# Patient Record
Sex: FEMALE | Race: BLACK OR AFRICAN AMERICAN | NOT HISPANIC OR LATINO | Employment: FULL TIME | URBAN - METROPOLITAN AREA
[De-identification: names, ages, dates, MRNs, and addresses within clinical notes are randomized per-mention and may not be internally consistent; named-entity substitution may affect disease eponyms.]

---

## 2023-05-02 LAB
ALANINE AMINOTRANSFERASE (SGPT) (U/L) IN SER/PLAS: 65 U/L (ref 7–45)
ALBUMIN (G/DL) IN SER/PLAS: 4.5 G/DL (ref 3.4–5)
ALKALINE PHOSPHATASE (U/L) IN SER/PLAS: 88 U/L (ref 33–110)
ANION GAP IN SER/PLAS: 11 MMOL/L (ref 10–20)
ASPARTATE AMINOTRANSFERASE (SGOT) (U/L) IN SER/PLAS: 38 U/L (ref 9–39)
BILIRUBIN TOTAL (MG/DL) IN SER/PLAS: 0.5 MG/DL (ref 0–1.2)
CALCIUM (MG/DL) IN SER/PLAS: 10.5 MG/DL (ref 8.6–10.6)
CARBON DIOXIDE, TOTAL (MMOL/L) IN SER/PLAS: 27 MMOL/L (ref 21–32)
CHLORIDE (MMOL/L) IN SER/PLAS: 102 MMOL/L (ref 98–107)
CHOLESTEROL (MG/DL) IN SER/PLAS: 156 MG/DL (ref 0–199)
CHOLESTEROL IN HDL (MG/DL) IN SER/PLAS: 51 MG/DL
CHOLESTEROL/HDL RATIO: 3.1
CREATININE (MG/DL) IN SER/PLAS: 0.71 MG/DL (ref 0.5–1.05)
ESTIMATED AVERAGE GLUCOSE FOR HBA1C: 206 MG/DL
GFR FEMALE: >90 ML/MIN/1.73M2
GLUCOSE (MG/DL) IN SER/PLAS: 165 MG/DL (ref 74–99)
HEMOGLOBIN A1C/HEMOGLOBIN TOTAL IN BLOOD: 8.8 %
LDL: 89 MG/DL (ref 0–99)
POTASSIUM (MMOL/L) IN SER/PLAS: 4.6 MMOL/L (ref 3.5–5.3)
PROTEIN TOTAL: 7.3 G/DL (ref 6.4–8.2)
SODIUM (MMOL/L) IN SER/PLAS: 135 MMOL/L (ref 136–145)
TRIGLYCERIDE (MG/DL) IN SER/PLAS: 82 MG/DL (ref 0–149)
UREA NITROGEN (MG/DL) IN SER/PLAS: 9 MG/DL (ref 6–23)
VLDL: 16 MG/DL (ref 0–40)

## 2023-05-03 LAB
ALBUMIN (MG/L) IN URINE: 24.5 MG/L
ALBUMIN/CREATININE (UG/MG) IN URINE: 17.3 UG/MG CRT (ref 0–30)
CREATININE (MG/DL) IN URINE: 142 MG/DL (ref 20–320)

## 2023-10-23 DIAGNOSIS — E11.9 TYPE 2 DIABETES MELLITUS WITHOUT COMPLICATION, WITHOUT LONG-TERM CURRENT USE OF INSULIN (MULTI): Primary | ICD-10-CM

## 2023-10-23 RX ORDER — FLASH GLUCOSE SENSOR
KIT MISCELLANEOUS
Qty: 2 EACH | Refills: 11 | Status: SHIPPED | OUTPATIENT
Start: 2023-10-23

## 2023-11-28 ENCOUNTER — ALLIED HEALTH (OUTPATIENT)
Dept: INTEGRATIVE MEDICINE | Facility: CLINIC | Age: 38
End: 2023-11-28
Payer: COMMERCIAL

## 2023-11-28 DIAGNOSIS — M99.04 SEGMENTAL AND SOMATIC DYSFUNCTION OF SACRAL REGION: ICD-10-CM

## 2023-11-28 DIAGNOSIS — M99.03 SEGMENTAL AND SOMATIC DYSFUNCTION OF LUMBAR REGION: Primary | ICD-10-CM

## 2023-11-28 DIAGNOSIS — M99.02 SEGMENTAL AND SOMATIC DYSFUNCTION OF THORACIC REGION: ICD-10-CM

## 2023-11-28 DIAGNOSIS — M54.50 LUMBOSACRAL PAIN: ICD-10-CM

## 2023-11-28 DIAGNOSIS — M54.17 LUMBOSACRAL RADICULITIS: ICD-10-CM

## 2023-11-28 DIAGNOSIS — M79.10 MYALGIA: ICD-10-CM

## 2023-11-28 PROCEDURE — 99203 OFFICE O/P NEW LOW 30 MIN: CPT | Performed by: CHIROPRACTOR

## 2023-11-28 PROCEDURE — 98941 CHIROPRACT MANJ 3-4 REGIONS: CPT | Performed by: CHIROPRACTOR

## 2023-11-28 ASSESSMENT — ENCOUNTER SYMPTOMS
DIFFICULTY URINATING: 0
FLANK PAIN: 0
MYALGIAS: 1
DIZZINESS: 0
UNEXPECTED WEIGHT CHANGE: 0
FEVER: 0
JOINT SWELLING: 0
BACK PAIN: 1
FATIGUE: 0
LIGHT-HEADEDNESS: 0
WEAKNESS: 0
NECK PAIN: 0
NECK STIFFNESS: 0
ARTHRALGIAS: 0
HEADACHES: 0
VOMITING: 0
NUMBNESS: 0
CHILLS: 0
TROUBLE SWALLOWING: 0
SHORTNESS OF BREATH: 0
CHEST TIGHTNESS: 0
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
## 2023-12-01 ENCOUNTER — ALLIED HEALTH (OUTPATIENT)
Dept: INTEGRATIVE MEDICINE | Facility: CLINIC | Age: 38
End: 2023-12-01
Payer: COMMERCIAL

## 2023-12-01 DIAGNOSIS — M99.03 SEGMENTAL AND SOMATIC DYSFUNCTION OF LUMBAR REGION: Primary | ICD-10-CM

## 2023-12-01 DIAGNOSIS — M54.50 LUMBOSACRAL PAIN: ICD-10-CM

## 2023-12-01 DIAGNOSIS — M54.17 LUMBOSACRAL RADICULITIS: ICD-10-CM

## 2023-12-01 DIAGNOSIS — M99.04 SEGMENTAL AND SOMATIC DYSFUNCTION OF SACRAL REGION: ICD-10-CM

## 2023-12-01 DIAGNOSIS — M99.02 SEGMENTAL AND SOMATIC DYSFUNCTION OF THORACIC REGION: ICD-10-CM

## 2023-12-01 DIAGNOSIS — M79.10 MYALGIA: ICD-10-CM

## 2023-12-01 PROCEDURE — 98941 CHIROPRACT MANJ 3-4 REGIONS: CPT | Performed by: CHIROPRACTOR

## 2023-12-01 NOTE — PROGRESS NOTES
Subjective   Patient ID: Sofya Beck is a 37 y.o. female who presents December 1, 2023 for low back pain.    (2/12) [facility]    Today, the patient rates their degree of pain as a 6 out of 10 on the numeric pain rating scale.     Sofya returns for continued treatment of low back pain. She states that she had mild improvement in symptoms after last visit. She states that she was able to sleep about 4 hours that night before discomfort woke her up. She states that the next morning going out into the cold all of her symptoms returned. She states that she is at baseline today. She states that she has been trying the stretches but they have caused pain while doing them. Denies any associated symptoms or change in medical history since last visit and will follow up in 1 week.            Objective   Physical Exam  Constitutional:       General: She is not in acute distress.     Appearance: Normal appearance.       HENT:      Head: Normocephalic and atraumatic.   Musculoskeletal:      Thoracic back: Tenderness present.      Lumbar back: Tenderness present. Decreased range of motion.   Neurological:      General: No focal deficit present.      Mental Status: She is alert and oriented to person, place, and time.      Cranial Nerves: No dysarthria or facial asymmetry.      Sensory: Sensation is intact.      Motor: Motor function is intact.      Coordination: Coordination is intact.      Gait: Gait is intact.   Psychiatric:         Attention and Perception: Attention normal.         Mood and Affect: Mood normal.         Speech: Speech normal.         Behavior: Behavior is cooperative.         Palpation of the following region(s) revealed:    Thoracic: Thoracic paraspinals bilateral, hypertonicity and tenderness.  Lumbar: Lumbar paraspinals bilateral, hypertonicity and tenderness.  Quadratus lumborum left, hypertonicity and tenderness.  Gluteal left, hypertonicity and tenderness.  Piriformis left, hypertonicity and  tenderness.        Segmental Joint(s): Segmental joint dysfunction was assessed with motion palpation and is identified in the following areas:  Thoracic : T3, T4, T8, T9, and T12  Lumbopelvic / Sacral SIJ : L1, L3, L5, L5/S1, and L SIJ  Upper / Lower Extremities : R Hip and L Hip      Assessment/Plan   Today's Treatment Included: Chiropractic manipulation to the  Segmental Joint(s) Lumbopelvic/Sacral SIJ : L1, L3, L5, L5/S1, and L SIJ Segmental Joint(s) Thoracic : T3, T4, T8, T9, and T12 Segmental Joints Upper/Lower Extremities : R Hip and L Hip  Treatment Techniques Used : Diversified CMT, Pelvic drop table technique, and Manual traction  Soft-Tissue manipulation  Integrative Dry Needling (IDN) - Needles in / out:  8. TS and LS paraspinals    Soft-tissue mobilization was performed in the following areas:     Thoracic Paraspinal mm. bilateral  Lumbar Paraspinal mm. bilateral, Quadratus Lumborum left, Gluteal mm.  left, and Piriformis left            The patient tolerated today's treatment with little or no additional discomfort and was instructed to contact the office for questions or concerns.

## 2023-12-14 ENCOUNTER — ALLIED HEALTH (OUTPATIENT)
Dept: INTEGRATIVE MEDICINE | Facility: CLINIC | Age: 38
End: 2023-12-14
Payer: COMMERCIAL

## 2023-12-14 DIAGNOSIS — M54.17 LUMBOSACRAL RADICULITIS: ICD-10-CM

## 2023-12-14 DIAGNOSIS — M99.02 SEGMENTAL AND SOMATIC DYSFUNCTION OF THORACIC REGION: ICD-10-CM

## 2023-12-14 DIAGNOSIS — M99.03 SEGMENTAL AND SOMATIC DYSFUNCTION OF LUMBAR REGION: Primary | ICD-10-CM

## 2023-12-14 DIAGNOSIS — M99.01 SEGMENTAL AND SOMATIC DYSFUNCTION OF CERVICAL REGION: ICD-10-CM

## 2023-12-14 DIAGNOSIS — M54.50 LUMBOSACRAL PAIN: ICD-10-CM

## 2023-12-14 DIAGNOSIS — M99.04 SEGMENTAL AND SOMATIC DYSFUNCTION OF SACRAL REGION: ICD-10-CM

## 2023-12-14 DIAGNOSIS — M79.10 MYALGIA: ICD-10-CM

## 2023-12-14 PROCEDURE — 98941 CHIROPRACT MANJ 3-4 REGIONS: CPT | Performed by: CHIROPRACTOR
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
## 2023-12-21 ENCOUNTER — ALLIED HEALTH (OUTPATIENT)
Dept: INTEGRATIVE MEDICINE | Facility: CLINIC | Age: 38
End: 2023-12-21
Payer: COMMERCIAL

## 2023-12-21 DIAGNOSIS — M79.10 MYALGIA: ICD-10-CM

## 2023-12-21 DIAGNOSIS — M99.01 SEGMENTAL AND SOMATIC DYSFUNCTION OF CERVICAL REGION: ICD-10-CM

## 2023-12-21 DIAGNOSIS — M54.50 LUMBOSACRAL PAIN: ICD-10-CM

## 2023-12-21 DIAGNOSIS — M99.03 SEGMENTAL AND SOMATIC DYSFUNCTION OF LUMBAR REGION: Primary | ICD-10-CM

## 2023-12-21 DIAGNOSIS — M99.02 SEGMENTAL AND SOMATIC DYSFUNCTION OF THORACIC REGION: ICD-10-CM

## 2023-12-21 DIAGNOSIS — M99.04 SEGMENTAL AND SOMATIC DYSFUNCTION OF SACRAL REGION: ICD-10-CM

## 2023-12-21 PROCEDURE — 98941 CHIROPRACT MANJ 3-4 REGIONS: CPT | Performed by: CHIROPRACTOR

## 2023-12-21 NOTE — PROGRESS NOTES
Subjective   Patient ID: Sofya Beck is a 37 y.o. female who presents December 21, 2023 for low back pain.    (4/12) [facility]    Today, the patient rates their degree of pain as a 4 out of 10 on the numeric pain rating scale.     Sofya returns for continued treatment of low back pain. She states that she had improvement in symptoms after last visit but as significantly as last visit. She states that TL junction discomfort has decreased and she has more left sided low back pain. She states that her neck felt better but tension has returned. Denies any associated symptoms or change in medical history since last visit and will follow up in 1 week.            Objective   Physical Exam  Constitutional:       General: She is not in acute distress.     Appearance: Normal appearance.       HENT:      Head: Normocephalic and atraumatic.   Musculoskeletal:      Thoracic back: Tenderness present.      Lumbar back: Tenderness present. Decreased range of motion.   Neurological:      General: No focal deficit present.      Mental Status: She is alert and oriented to person, place, and time.      Cranial Nerves: No dysarthria or facial asymmetry.      Sensory: Sensation is intact.      Motor: Motor function is intact.      Coordination: Coordination is intact.      Gait: Gait is intact.   Psychiatric:         Attention and Perception: Attention normal.         Mood and Affect: Mood normal.         Speech: Speech normal.         Behavior: Behavior is cooperative.         Palpation of the following region(s) revealed:    Thoracic: Thoracic paraspinals bilateral, hypertonicity and tenderness.  Lumbar: Lumbar paraspinals bilateral, hypertonicity and tenderness.  Quadratus lumborum left, hypertonicity and tenderness.  Gluteal left, hypertonicity and tenderness.  Piriformis left, hypertonicity and tenderness.        Segmental Joint(s): Segmental joint dysfunction was assessed with motion palpation and is identified in the following  areas:  Cervical : C2 C6 C7  Thoracic : T3, T4, T8, T9, and T12  Lumbopelvic / Sacral SIJ : L1, L3, L5, L5/S1, and L SIJ  Upper / Lower Extremities : R Hip and L Hip      Assessment/Plan   Today's Treatment Included: Chiropractic manipulation to the Segmental Joint(s) Cervical : C2 C6 C7 Segmental Joint(s) Lumbopelvic/Sacral SIJ : L1, L3, L5, L5/S1, and L SIJ Segmental Joint(s) Thoracic : T3, T4, T8, T9, and T12 Segmental Joints Upper/Lower Extremities : R Hip and L Hip  Treatment Techniques Used : Diversified CMT, Pelvic drop table technique, and Manual traction  Soft-Tissue manipulation  Integrative Dry Needling (IDN) - Needles in / out:  8. TS and LS paraspinals, left    Soft-tissue mobilization was performed in the following areas:     Thoracic Paraspinal mm. bilateral  Lumbar Paraspinal mm. bilateral, Quadratus Lumborum left, Gluteal mm.  left, and Piriformis left            The patient tolerated today's treatment with little or no additional discomfort and was instructed to contact the office for questions or concerns.

## 2023-12-28 ENCOUNTER — ALLIED HEALTH (OUTPATIENT)
Dept: INTEGRATIVE MEDICINE | Facility: CLINIC | Age: 38
End: 2023-12-28
Payer: COMMERCIAL

## 2023-12-28 DIAGNOSIS — M99.03 SEGMENTAL AND SOMATIC DYSFUNCTION OF LUMBAR REGION: Primary | ICD-10-CM

## 2023-12-28 DIAGNOSIS — M79.10 MYALGIA: ICD-10-CM

## 2023-12-28 DIAGNOSIS — M99.04 SEGMENTAL AND SOMATIC DYSFUNCTION OF SACRAL REGION: ICD-10-CM

## 2023-12-28 DIAGNOSIS — M54.50 LUMBOSACRAL PAIN: ICD-10-CM

## 2023-12-28 DIAGNOSIS — M99.02 SEGMENTAL AND SOMATIC DYSFUNCTION OF THORACIC REGION: ICD-10-CM

## 2023-12-28 DIAGNOSIS — M99.01 SEGMENTAL AND SOMATIC DYSFUNCTION OF CERVICAL REGION: ICD-10-CM

## 2023-12-28 PROCEDURE — 98941 CHIROPRACT MANJ 3-4 REGIONS: CPT | Performed by: CHIROPRACTOR

## 2023-12-28 NOTE — PROGRESS NOTES
Subjective   Patient ID: Sofya Beck is a 38 y.o. female who presents December 28, 2023 for low back pain.    (5/12) [facility]    Today, the patient rates their degree of pain as a 3 out of 10 on the numeric pain rating scale.     Sofya returns for continued treatment of low back pain. She states that she had improvement in back symptoms. She states that she has no more TL junction symptoms. She states that she has mild low back discomfort. She states that she woke up yesterday with torticollis and neck pain. She states that she took NSAIDs and that has helped relieve symptoms. She states that symptoms are better today but she still has limited cervical ROM and pain in right rotation. Denies any associated symptoms or change in medical history since last visit and will follow up in 1 week.            Objective   Physical Exam  Constitutional:       General: She is not in acute distress.     Appearance: Normal appearance.       HENT:      Head: Normocephalic and atraumatic.   Musculoskeletal:      Thoracic back: Tenderness present.      Lumbar back: Tenderness present. Decreased range of motion.   Neurological:      General: No focal deficit present.      Mental Status: She is alert and oriented to person, place, and time.      Cranial Nerves: No dysarthria or facial asymmetry.      Sensory: Sensation is intact.      Motor: Motor function is intact.      Coordination: Coordination is intact.      Gait: Gait is intact.   Psychiatric:         Attention and Perception: Attention normal.         Mood and Affect: Mood normal.         Speech: Speech normal.         Behavior: Behavior is cooperative.       Palpation of the following region(s) revealed:    Thoracic: Thoracic paraspinals bilateral, hypertonicity and tenderness.  Lumbar: Lumbar paraspinals bilateral, hypertonicity and tenderness.  Quadratus lumborum left, hypertonicity and tenderness.  Gluteal left, hypertonicity and tenderness.  Piriformis left,  hypertonicity and tenderness.        Segmental Joint(s): Segmental joint dysfunction was assessed with motion palpation and is identified in the following areas:  Cervical : C2 C6 C7  Thoracic : T3, T4, T8, T9, and T12  Lumbopelvic / Sacral SIJ : L1, L3, L5, L5/S1, and L SIJ  Upper / Lower Extremities : R Hip and L Hip      Assessment/Plan   Today's Treatment Included: Chiropractic manipulation to the Segmental Joint(s) Cervical : C2 C6 C7 Segmental Joint(s) Lumbopelvic/Sacral SIJ : L1, L3, L5, L5/S1, and L SIJ Segmental Joint(s) Thoracic : T3, T4, T8, T9, and T12 Segmental Joints Upper/Lower Extremities : R Hip and L Hip  Treatment Techniques Used : Diversified CMT, Pelvic drop table technique, and Manual traction  Soft-Tissue manipulation  Integrative Dry Needling (IDN) - Needles in / out:  4. LS paraspinals, bilateral    Soft-tissue mobilization was performed in the following areas:     Thoracic Paraspinal mm. bilateral  Lumbar Paraspinal mm. bilateral, Quadratus Lumborum left, Gluteal mm.  left, and Piriformis left            The patient tolerated today's treatment with little or no additional discomfort and was instructed to contact the office for questions or concerns.

## 2024-01-05 NOTE — PROGRESS NOTES
[facility]UV for diabetes. LV with me 2023.    History of Present IllnessCGM INTERPRETATION:  38 y.o. female with hx of uncontrolled type 2 DM .     Dx: 2016  HbA1c: 6.4% (2024), 8.8% (2023), 9.9% (), 12.6% (2021)  Current regimen: metformin 1g BID, glipizide 10 mg BID, Ozempic 1 mg/week  Past medications: Victoza (nausea), Trulicity  Complications: neuropathy  Comorbidities: obesity, HLD     SMBG: Abraham 2     Hypoglycemia: no     Diet:  Breakfast: sausage and eggs  Lunch sandwich and chips  Dinner baked chicken and spinach  Drinks: mostly unsweet tea but sometimes sweetened, occasional regular ginger ale    ROS  General: no fever or chills  CV: no chest pain   Respiratory: no shortness of breath  MSK: no lower extremity edema  Neuro: no headache or dizziness  See HPI for Endocrine ROS    Past Medical History:   Diagnosis Date    Personal history of other endocrine, nutritional and metabolic disease 2020    History of diabetes mellitus       Past Surgical History:   Procedure Laterality Date     SECTION, LOW TRANSVERSE  2015     Section Low Transverse       Social History     Socioeconomic History    Marital status: Single     Spouse name: Not on file    Number of children: Not on file    Years of education: Not on file    Highest education level: Not on file   Occupational History    Not on file   Tobacco Use    Smoking status: Unknown    Smokeless tobacco: Not on file   Substance and Sexual Activity    Alcohol use: Not on file    Drug use: Not on file    Sexual activity: Not on file   Other Topics Concern    Not on file   Social History Narrative    Not on file     Social Determinants of Health     Financial Resource Strain: Not on file   Food Insecurity: Not on file   Transportation Needs: Not on file   Physical Activity: Not on file   Stress: Not on file   Social Connections: Not on file   Intimate Partner Violence: Not on file   Housing Stability: Not on file        Physical Exam   weight is 91.2 kg (201 lb 1.6 oz). Her temperature is 36.3 °C (97.3 °F). Her blood pressure is 126/79 and her pulse is 79. Her respiration is 16.   General: not in acute distress  HEENT: RYAN HUDSON  Thyroid: no goiter  Neuro: alert and oriented x 3    Current Outpatient Medications   Medication Sig Dispense Refill    albuterol 90 mcg/actuation inhaler Inhale 2 puffs every 6 hours if needed.      aspirin 81 mg EC tablet Take 1 tablet (81 mg) by mouth once daily.      glipiZIDE (Glucotrol) 10 mg tablet Take 1 tablet (10 mg) by mouth 2 times a day. before meals      metFORMIN (Glucophage) 1,000 mg tablet Take 1 tablet (1,000 mg) by mouth 2 times a day with meals.      simvastatin (Zocor) 20 mg tablet Take 1 tablet (20 mg) by mouth once daily.      acetaminophen (Tylenol) 500 mg tablet Take 2 tablets (1,000 mg) by mouth every 8 hours if needed.      FreeStyle Abraham sensor system (FreeStyle Abraham 2 Sensor) kit CHANGE SENSOR EVERY 14 DAYS AS DIRECTED 2 each 11    Ozempic 1 mg/dose (4 mg/3 mL) pen injector Inject 1 mg under the skin 1 (one) time per week.       No current facility-administered medications for this visit.         Assessment and Plan  38 y.o. female with hx of uncontrolled T2DM, here for follow-up.     1. Type 2 diabetes mellitus, uncontrolled  HbA1c: 6.4% (1/11/2024), 8.8% (05/2023), 9.9% (11/22/52021), 12.6% (07/2021)  Current regimen: metformin 1g BID, glipizide 10 mg BID, Ozempic 1 mg/week  Eye exam: no DR (02/2023)  Urine microalbumin: 17 mg/g (05/2023)  Lipids: HDL 51, LDL 89, TG 82 (05/2023) on simvastatin 20 mg QDAY     A1c: 6.4%!   Abraham download reviewed. TIR 74%, hypoglycemia 2%.  Significant improvement in blood sugars since changing from Trulicity to Ozempic.  Having fasting hypoglycemia. Will reduce evening glipizide. She takes the glipizide after dinner; advised to take before dinner.  Tolerating Ozempic without issue. Will increase to 2 mg dose.  The patient is looking  forward to being able to reduce her medications.  Discussed that we will try to reduce/discontinue glipizide first, then metformin if glycemic control allows.  Discussed that reducing carbs is still necessary to continue to reduce medications given the post-prandial hyperglycemia she has.    PLAN:  -increase Ozempic 2 mg/week  -decrease glipizide to 10 mg AM, 5 mg evening (before dinner)  -continue metformin 1g BID  -continue simvastatin 20 mg QDAY  -advised patient to notify me for BG<80  -due for eye exam 02/2024 (patient to schedule)  -check HbA1c, urine microalbumin, lipids before next visit    Follow-up in 4 months (labs prior)  Uses MyChart.

## 2024-01-11 ENCOUNTER — OFFICE VISIT (OUTPATIENT)
Dept: ENDOCRINOLOGY | Facility: CLINIC | Age: 39
End: 2024-01-11
Payer: COMMERCIAL

## 2024-01-11 ENCOUNTER — ALLIED HEALTH (OUTPATIENT)
Dept: INTEGRATIVE MEDICINE | Facility: CLINIC | Age: 39
End: 2024-01-11
Payer: COMMERCIAL

## 2024-01-11 VITALS
WEIGHT: 201.1 LBS | HEART RATE: 79 BPM | RESPIRATION RATE: 16 BRPM | TEMPERATURE: 97.3 F | BODY MASS INDEX: 32.46 KG/M2 | DIASTOLIC BLOOD PRESSURE: 79 MMHG | SYSTOLIC BLOOD PRESSURE: 126 MMHG

## 2024-01-11 DIAGNOSIS — M99.02 SEGMENTAL AND SOMATIC DYSFUNCTION OF THORACIC REGION: ICD-10-CM

## 2024-01-11 DIAGNOSIS — E11.65 TYPE 2 DIABETES MELLITUS WITH HYPERGLYCEMIA, WITHOUT LONG-TERM CURRENT USE OF INSULIN (MULTI): Primary | ICD-10-CM

## 2024-01-11 DIAGNOSIS — M99.01 SEGMENTAL AND SOMATIC DYSFUNCTION OF CERVICAL REGION: ICD-10-CM

## 2024-01-11 DIAGNOSIS — E78.2 MIXED HYPERLIPIDEMIA: ICD-10-CM

## 2024-01-11 DIAGNOSIS — M54.50 LUMBOSACRAL PAIN: ICD-10-CM

## 2024-01-11 DIAGNOSIS — M79.10 MYALGIA: ICD-10-CM

## 2024-01-11 DIAGNOSIS — M99.04 SEGMENTAL AND SOMATIC DYSFUNCTION OF SACRAL REGION: ICD-10-CM

## 2024-01-11 DIAGNOSIS — M99.03 SEGMENTAL AND SOMATIC DYSFUNCTION OF LUMBAR REGION: Primary | ICD-10-CM

## 2024-01-11 DIAGNOSIS — E10.69 TYPE 1 DIABETES MELLITUS WITH OTHER SPECIFIED COMPLICATION (MULTI): ICD-10-CM

## 2024-01-11 LAB — POC HEMOGLOBIN A1C: 6.4 % (ref 4.2–6.5)

## 2024-01-11 PROCEDURE — 3078F DIAST BP <80 MM HG: CPT | Performed by: STUDENT IN AN ORGANIZED HEALTH CARE EDUCATION/TRAINING PROGRAM

## 2024-01-11 PROCEDURE — 95251 CONT GLUC MNTR ANALYSIS I&R: CPT | Performed by: STUDENT IN AN ORGANIZED HEALTH CARE EDUCATION/TRAINING PROGRAM

## 2024-01-11 PROCEDURE — 3074F SYST BP LT 130 MM HG: CPT | Performed by: STUDENT IN AN ORGANIZED HEALTH CARE EDUCATION/TRAINING PROGRAM

## 2024-01-11 PROCEDURE — 99215 OFFICE O/P EST HI 40 MIN: CPT | Performed by: STUDENT IN AN ORGANIZED HEALTH CARE EDUCATION/TRAINING PROGRAM

## 2024-01-11 PROCEDURE — 98941 CHIROPRACT MANJ 3-4 REGIONS: CPT | Performed by: CHIROPRACTOR

## 2024-01-11 PROCEDURE — 83036 HEMOGLOBIN GLYCOSYLATED A1C: CPT | Performed by: STUDENT IN AN ORGANIZED HEALTH CARE EDUCATION/TRAINING PROGRAM

## 2024-01-11 RX ORDER — METFORMIN HYDROCHLORIDE 1000 MG/1
1000 TABLET ORAL
COMMUNITY
Start: 2023-08-04 | End: 2024-01-19

## 2024-01-11 RX ORDER — GLIPIZIDE 10 MG/1
10 TABLET ORAL 2 TIMES DAILY
COMMUNITY
Start: 2023-08-04 | End: 2024-01-11 | Stop reason: SDUPTHER

## 2024-01-11 RX ORDER — SIMVASTATIN 20 MG/1
20 TABLET, FILM COATED ORAL
Qty: 90 TABLET | Refills: 3 | Status: SHIPPED | OUTPATIENT
Start: 2024-01-11

## 2024-01-11 RX ORDER — SEMAGLUTIDE 2.68 MG/ML
2 INJECTION, SOLUTION SUBCUTANEOUS
Qty: 9 ML | Refills: 1 | Status: SHIPPED | OUTPATIENT
Start: 2024-01-11

## 2024-01-11 RX ORDER — SIMVASTATIN 20 MG/1
20 TABLET, FILM COATED ORAL
COMMUNITY
Start: 2016-05-18 | End: 2024-01-11 | Stop reason: SDUPTHER

## 2024-01-11 RX ORDER — GLIPIZIDE 10 MG/1
TABLET ORAL
Qty: 135 TABLET | Refills: 1 | Status: SHIPPED | OUTPATIENT
Start: 2024-01-11

## 2024-01-11 RX ORDER — SEMAGLUTIDE 1.34 MG/ML
1 INJECTION, SOLUTION SUBCUTANEOUS
COMMUNITY
End: 2024-01-11 | Stop reason: ALTCHOICE

## 2024-01-11 RX ORDER — ALBUTEROL SULFATE 90 UG/1
2 AEROSOL, METERED RESPIRATORY (INHALATION) EVERY 6 HOURS PRN
COMMUNITY
Start: 2021-08-14

## 2024-01-11 RX ORDER — ASPIRIN 81 MG/1
81 TABLET ORAL DAILY
COMMUNITY
Start: 2018-03-20

## 2024-01-11 RX ORDER — ACETAMINOPHEN 500 MG
1000 TABLET ORAL EVERY 8 HOURS PRN
COMMUNITY

## 2024-01-11 ASSESSMENT — PAIN SCALES - GENERAL: PAINLEVEL: 0-NO PAIN

## 2024-01-11 NOTE — PATIENT INSTRUCTIONS
Increase Ozempic to 2 mg, once a week  Change glipizide to 1 tablet in the morning and 1/2 table before dinner  Continue Metformin as you are  Please have blood and urine tests a few days before your next appointment with me

## 2024-01-11 NOTE — PROGRESS NOTES
Subjective   Patient ID: Sofya Beck is a 38 y.o. female who presents January 11, 2024 for low back pain.    (1/12) VPCY    Today, the patient rates their degree of pain as a 2 out of 10 on the numeric pain rating scale.     Sofya returns for continued treatment of low back pain. She states that she has less low back discomfort. She states that it is mostly just tender to the touch. She states that her neck is slightly better, she has much more range of motion but it is still painful. 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 Denies any associated symptoms or change in medical history since last visit and will follow up in 1 week.            Objective   Physical Exam  Constitutional:       General: She is not in acute distress.     Appearance: Normal appearance.       HENT:      Head: Normocephalic and atraumatic.   Musculoskeletal:      Thoracic back: Tenderness present.      Lumbar back: Tenderness present. Decreased range of motion.   Neurological:      General: No focal deficit present.      Mental Status: She is alert and oriented to person, place, and time.      Cranial Nerves: No dysarthria or facial asymmetry.      Sensory: Sensation is intact.      Motor: Motor function is intact.      Coordination: Coordination is intact.      Gait: Gait is intact.   Psychiatric:         Attention and Perception: Attention normal.         Mood and Affect: Mood normal.         Speech: Speech normal.         Behavior: Behavior is cooperative.         Palpation of the following region(s) revealed:    Thoracic: Thoracic paraspinals bilateral, hypertonicity and tenderness.  Lumbar: Lumbar paraspinals bilateral, hypertonicity and tenderness.  Quadratus lumborum left, hypertonicity and tenderness.  Gluteal left, hypertonicity and tenderness.  Piriformis left, hypertonicity and tenderness.        Segmental Joint(s): Segmental joint dysfunction was assessed  with motion palpation and is identified in the following areas:  Cervical : C2 C6 C7  Thoracic : T3, T4, T8, T9, and T12  Lumbopelvic / Sacral SIJ : L1, L3, L5, L5/S1, and L SIJ  Upper / Lower Extremities : R Hip and L Hip      Assessment/Plan   Today's Treatment Included: Chiropractic manipulation to the Segmental Joint(s) Cervical : C2 C6 C7 Segmental Joint(s) Lumbopelvic/Sacral SIJ : L1, L3, L5, L5/S1, and L SIJ Segmental Joint(s) Thoracic : T3, T4, T8, T9, and T12 Segmental Joints Upper/Lower Extremities : R Hip and L Hip  Treatment Techniques Used : Diversified CMT, Pelvic drop table technique, and Manual traction  Soft-Tissue manipulation     Soft-tissue mobilization was performed in the following areas:     Thoracic Paraspinal mm. bilateral  Lumbar Paraspinal mm. bilateral, Quadratus Lumborum left, Gluteal mm.  left, and Piriformis left            The patient tolerated today's treatment with little or no additional discomfort and was instructed to contact the office for questions or concerns.

## 2024-01-18 DIAGNOSIS — E11.65 TYPE 2 DIABETES MELLITUS WITH HYPERGLYCEMIA, WITHOUT LONG-TERM CURRENT USE OF INSULIN (MULTI): Primary | ICD-10-CM

## 2024-01-19 RX ORDER — METFORMIN HYDROCHLORIDE 1000 MG/1
1000 TABLET ORAL 2 TIMES DAILY
Qty: 180 TABLET | Refills: 2 | Status: SHIPPED | OUTPATIENT
Start: 2024-01-19

## 2024-03-05 ENCOUNTER — TELEPHONE (OUTPATIENT)
Dept: PRIMARY CARE | Facility: CLINIC | Age: 39
End: 2024-03-05
Payer: COMMERCIAL

## 2024-03-05 DIAGNOSIS — R42 DIZZINESS: Primary | ICD-10-CM

## 2024-03-05 RX ORDER — SCOLOPAMINE TRANSDERMAL SYSTEM 1 MG/1
1 PATCH, EXTENDED RELEASE TRANSDERMAL
Qty: 10 PATCH | Refills: 0 | Status: SHIPPED | OUTPATIENT
Start: 2024-03-05 | End: 2024-05-04

## 2024-06-25 ENCOUNTER — LAB (OUTPATIENT)
Dept: LAB | Facility: LAB | Age: 39
End: 2024-06-25
Payer: COMMERCIAL

## 2024-06-25 DIAGNOSIS — E11.65 TYPE 2 DIABETES MELLITUS WITH HYPERGLYCEMIA, WITHOUT LONG-TERM CURRENT USE OF INSULIN (MULTI): ICD-10-CM

## 2024-06-25 LAB
CREAT UR-MCNC: 137.7 MG/DL (ref 20–320)
MICROALBUMIN UR-MCNC: <7 MG/L
MICROALBUMIN/CREAT UR: NORMAL MG/G{CREAT}

## 2024-06-25 PROCEDURE — 80053 COMPREHEN METABOLIC PANEL: CPT

## 2024-06-25 PROCEDURE — 36415 COLL VENOUS BLD VENIPUNCTURE: CPT

## 2024-06-25 PROCEDURE — 82570 ASSAY OF URINE CREATININE: CPT

## 2024-06-25 PROCEDURE — 80061 LIPID PANEL: CPT

## 2024-06-25 PROCEDURE — 83036 HEMOGLOBIN GLYCOSYLATED A1C: CPT

## 2024-06-25 PROCEDURE — 82043 UR ALBUMIN QUANTITATIVE: CPT

## 2024-06-26 LAB
ALBUMIN SERPL BCP-MCNC: 4.6 G/DL (ref 3.4–5)
ALP SERPL-CCNC: 87 U/L (ref 33–110)
ALT SERPL W P-5'-P-CCNC: 50 U/L (ref 7–45)
ANION GAP SERPL CALC-SCNC: 13 MMOL/L (ref 10–20)
AST SERPL W P-5'-P-CCNC: 27 U/L (ref 9–39)
BILIRUB SERPL-MCNC: 0.2 MG/DL (ref 0–1.2)
BUN SERPL-MCNC: 11 MG/DL (ref 6–23)
CALCIUM SERPL-MCNC: 10 MG/DL (ref 8.6–10.6)
CHLORIDE SERPL-SCNC: 101 MMOL/L (ref 98–107)
CHOLEST SERPL-MCNC: 172 MG/DL (ref 0–199)
CHOLESTEROL/HDL RATIO: 3.1
CO2 SERPL-SCNC: 28 MMOL/L (ref 21–32)
CREAT SERPL-MCNC: 0.78 MG/DL (ref 0.5–1.05)
EGFRCR SERPLBLD CKD-EPI 2021: >90 ML/MIN/1.73M*2
EST. AVERAGE GLUCOSE BLD GHB EST-MCNC: 111 MG/DL
GLUCOSE SERPL-MCNC: 100 MG/DL (ref 74–99)
HBA1C MFR BLD: 5.5 %
HDLC SERPL-MCNC: 54.7 MG/DL
LDLC SERPL CALC-MCNC: 103 MG/DL
NON HDL CHOLESTEROL: 117 MG/DL (ref 0–149)
POTASSIUM SERPL-SCNC: 4.1 MMOL/L (ref 3.5–5.3)
PROT SERPL-MCNC: 7.3 G/DL (ref 6.4–8.2)
SODIUM SERPL-SCNC: 138 MMOL/L (ref 136–145)
TRIGL SERPL-MCNC: 71 MG/DL (ref 0–149)
VLDL: 14 MG/DL (ref 0–40)

## 2024-06-28 NOTE — PROGRESS NOTES
FUV for diabetes. LV with me 2024.    History of Present IllnessCGM INTERPRETATION:  38 y.o. female with hx of type 2 diabetes.     Dx: 2016  HbA1c: 5.5% (2024), 6.4% (2024), 8.8% (2023), 9.9% (), 12.6% (2021)  Current regimen: metformin 1g BID, glipizide 10 mg QDAY, Ozempic 2 mg/week  Past medications: Victoza (nausea), Trulicity  Complications: neuropathy  Comorbidities: obesity, HLD     SMBG: Abraham 2     Hypoglycemia: no     Diet:  Breakfast: sausage and eggs  Lunch sandwich and chips  Dinner baked chicken and spinach  Drinks: mostly unsweet tea but sometimes sweetened, occasional regular ginger ale    Today:  -decreased glipizide to 10 mg QDAY about 1-2 weeks ago    ROS  General: no fever or chills  CV: no chest pain   Respiratory: no shortness of breath  MSK: no lower extremity edema  Neuro: no headache or dizziness  See HPI for Endocrine ROS    Past Medical History:   Diagnosis Date    Personal history of other endocrine, nutritional and metabolic disease 2020    History of diabetes mellitus       Past Surgical History:   Procedure Laterality Date     SECTION, LOW TRANSVERSE  2015     Section Low Transverse       Social History     Socioeconomic History    Marital status: Single     Spouse name: Not on file    Number of children: Not on file    Years of education: Not on file    Highest education level: Not on file   Occupational History    Not on file   Tobacco Use    Smoking status: Unknown    Smokeless tobacco: Not on file   Substance and Sexual Activity    Alcohol use: Not on file    Drug use: Not on file    Sexual activity: Not on file   Other Topics Concern    Not on file   Social History Narrative    Not on file     Social Determinants of Health     Financial Resource Strain: Not on file   Food Insecurity: Not on file   Transportation Needs: Not on file   Physical Activity: Not on file   Stress: Not on file   Social Connections: Not on file    Intimate Partner Violence: Not on file   Housing Stability: Not on file       Physical Exam   vitals were not taken for this visit.   General: not in acute distress  HEENT: RYAN HUDSON  Thyroid: no goiter  Neuro: alert and oriented x 3    Current Outpatient Medications   Medication Sig Dispense Refill    acetaminophen (Tylenol) 500 mg tablet Take 2 tablets (1,000 mg) by mouth every 8 hours if needed.      albuterol 90 mcg/actuation inhaler Inhale 2 puffs every 6 hours if needed.      aspirin 81 mg EC tablet Take 1 tablet (81 mg) by mouth once daily.      FreeStyle Abraham sensor system (FreeStyle Abraham 2 Sensor) kit CHANGE SENSOR EVERY 14 DAYS AS DIRECTED 2 each 11    glipiZIDE (Glucotrol) 10 mg tablet Take 1 tablet before breakfast and 1/2 tablet before dinner. 135 tablet 1    metFORMIN (Glucophage) 1,000 mg tablet TAKE ONE TABLET BY MOUTH TWO TIMES A  tablet 2    semaglutide (Ozempic) 2 mg/dose (8 mg/3 mL) pen injector Inject 2 mg under the skin 1 (one) time per week. 9 mL 1    simvastatin (Zocor) 20 mg tablet Take 1 tablet (20 mg) by mouth once daily. 90 tablet 3     No current facility-administered medications for this visit.         Assessment and Plan  38 y.o. female with hx of uncontrolled T2DM, here for follow-up.     1. Type 2 diabetes mellitus, uncontrolled  HbA1c: 5.5% (6/25/2024), 6.4% (1/11/2024), 8.8% (05/2023), 9.9% (11/22/52021), 12.6% (07/2021)  Current regimen: metformin 1g BID, glipizide 10 mg QDAY, Ozempic 2 mg/week  Eye exam: no DR (02/2023)  Urine microalbumin: neg (06/2024)  Podiatry: LV 2022, was told to see on a prn basis  Lipids: HDL 55, , TG 71 (06/2024) on simvastatin 20 mg QDAY     A1c: 5.5% but still having frequent hypoglycemia.  Abraham download reviewed.   Self-reduced glipizide to 10 mg QDAY 1-2 weeks ago.  Will have her reduce this further to 5 mg QDAY.    She admits to forgetting to take simvastatin half of the time. Takes it at night due to cramping.  Discussed  changing to a different statin. She would like to try taking the simvastatin in the morning and see how she does. She will let me know if the cramping is intolerable. We will switch to a different statin.    Switch to Abraham 3. Phone is compatible with [facility].    PLAN:  -continue Ozempic 2 mg/week  -decrease glipizide to 5 mg QAM  -continue metformin 1g BID  -change simvastatin 20 mg to QDAY  -advised patient to notify me for BG<80  -due for eye exam 02/2024 (reminded patient to schedule)  -check HbA1c, lipids before next visit    Follow-up in  4 months (labs prior)  Uses [facility].

## 2024-07-02 ENCOUNTER — APPOINTMENT (OUTPATIENT)
Dept: ENDOCRINOLOGY | Facility: CLINIC | Age: 39
End: 2024-07-02
Payer: COMMERCIAL

## 2024-07-02 ENCOUNTER — TELEPHONE (OUTPATIENT)
Dept: ENDOCRINOLOGY | Facility: CLINIC | Age: 39
End: 2024-07-02

## 2024-07-02 VITALS
HEART RATE: 78 BPM | SYSTOLIC BLOOD PRESSURE: 105 MMHG | DIASTOLIC BLOOD PRESSURE: 65 MMHG | HEIGHT: 66 IN | BODY MASS INDEX: 31.66 KG/M2 | WEIGHT: 197 LBS | RESPIRATION RATE: 20 BRPM | TEMPERATURE: 97.6 F

## 2024-07-02 DIAGNOSIS — E11.65 TYPE 2 DIABETES MELLITUS WITH HYPERGLYCEMIA, WITHOUT LONG-TERM CURRENT USE OF INSULIN (MULTI): Primary | ICD-10-CM

## 2024-07-02 PROCEDURE — 95251 CONT GLUC MNTR ANALYSIS I&R: CPT | Performed by: STUDENT IN AN ORGANIZED HEALTH CARE EDUCATION/TRAINING PROGRAM

## 2024-07-02 PROCEDURE — 3078F DIAST BP <80 MM HG: CPT | Performed by: STUDENT IN AN ORGANIZED HEALTH CARE EDUCATION/TRAINING PROGRAM

## 2024-07-02 PROCEDURE — 3062F POS MACROALBUMINURIA REV: CPT | Performed by: STUDENT IN AN ORGANIZED HEALTH CARE EDUCATION/TRAINING PROGRAM

## 2024-07-02 PROCEDURE — 99215 OFFICE O/P EST HI 40 MIN: CPT | Performed by: STUDENT IN AN ORGANIZED HEALTH CARE EDUCATION/TRAINING PROGRAM

## 2024-07-02 PROCEDURE — 3049F LDL-C 100-129 MG/DL: CPT | Performed by: STUDENT IN AN ORGANIZED HEALTH CARE EDUCATION/TRAINING PROGRAM

## 2024-07-02 PROCEDURE — 3044F HG A1C LEVEL LT 7.0%: CPT | Performed by: STUDENT IN AN ORGANIZED HEALTH CARE EDUCATION/TRAINING PROGRAM

## 2024-07-02 PROCEDURE — 3074F SYST BP LT 130 MM HG: CPT | Performed by: STUDENT IN AN ORGANIZED HEALTH CARE EDUCATION/TRAINING PROGRAM

## 2024-07-02 RX ORDER — GLIPIZIDE 5 MG/1
TABLET ORAL
Qty: 90 TABLET | Refills: 3 | Status: SHIPPED | OUTPATIENT
Start: 2024-07-02

## 2024-07-02 RX ORDER — SEMAGLUTIDE 2.68 MG/ML
2 INJECTION, SOLUTION SUBCUTANEOUS
Qty: 9 ML | Refills: 3 | Status: SHIPPED | OUTPATIENT
Start: 2024-07-02

## 2024-07-02 RX ORDER — BLOOD-GLUCOSE SENSOR
EACH MISCELLANEOUS
Qty: 2 EACH | Refills: 11 | Status: SHIPPED | OUTPATIENT
Start: 2024-07-02

## 2024-07-02 ASSESSMENT — PATIENT HEALTH QUESTIONNAIRE - PHQ9
2. FEELING DOWN, DEPRESSED OR HOPELESS: NOT AT ALL
1. LITTLE INTEREST OR PLEASURE IN DOING THINGS: NOT AT ALL
SUM OF ALL RESPONSES TO PHQ9 QUESTIONS 1 AND 2: 0

## 2024-07-02 NOTE — PATIENT INSTRUCTIONS
Decrease glipizide to 5 mg, once a day in the morning  Continue metformin and Ozempic as you are  Please schedule your yearly eye exam    <Connect your Abraham 3 [facility] to our Clinic>  Go to menu  Go to connected apps  Tap [facility]  4.  Tap connect to practice  5.  Practice ID: [id number]  6.  Tap Next

## 2024-08-20 ENCOUNTER — TELEPHONE (OUTPATIENT)
Dept: PRIMARY CARE | Facility: CLINIC | Age: 39
End: 2024-08-20
Payer: COMMERCIAL

## 2024-10-03 ENCOUNTER — OFFICE VISIT (OUTPATIENT)
Dept: OPHTHALMOLOGY | Facility: CLINIC | Age: 39
End: 2024-10-03
Payer: COMMERCIAL

## 2024-10-03 DIAGNOSIS — E11.9 TYPE 2 DIABETES MELLITUS WITHOUT COMPLICATION, WITHOUT LONG-TERM CURRENT USE OF INSULIN (MULTI): Primary | ICD-10-CM

## 2024-10-03 DIAGNOSIS — H52.13 MYOPIA OF BOTH EYES: ICD-10-CM

## 2024-10-03 DIAGNOSIS — H52.223 REGULAR ASTIGMATISM OF BOTH EYES: ICD-10-CM

## 2024-10-03 PROBLEM — F41.8 DEPRESSION WITH ANXIETY: Status: ACTIVE | Noted: 2024-10-03

## 2024-10-03 PROBLEM — J30.9 ALLERGIC RHINITIS: Status: ACTIVE | Noted: 2024-10-03

## 2024-10-03 PROCEDURE — FLVLF CONTACT LENS EVALUATION (SP): Performed by: OPTOMETRIST

## 2024-10-03 PROCEDURE — 92015 DETERMINE REFRACTIVE STATE: CPT | Performed by: OPTOMETRIST

## 2024-10-03 PROCEDURE — 99214 OFFICE O/P EST MOD 30 MIN: CPT | Performed by: OPTOMETRIST

## 2024-10-03 ASSESSMENT — VISUAL ACUITY
METHOD: SNELLEN - LINEAR
OS_CC: 20/20
OD_CC: 20/20
OS_CC+: -2
OD_CC+: -1
CORRECTION_TYPE: GLASSES

## 2024-10-03 ASSESSMENT — ENCOUNTER SYMPTOMS
CONSTITUTIONAL NEGATIVE: 0
RESPIRATORY NEGATIVE: 0
NEUROLOGICAL NEGATIVE: 0
ENDOCRINE NEGATIVE: 1
CARDIOVASCULAR NEGATIVE: 0
GASTROINTESTINAL NEGATIVE: 0
MUSCULOSKELETAL NEGATIVE: 0
PSYCHIATRIC NEGATIVE: 0
EYES NEGATIVE: 0
HEMATOLOGIC/LYMPHATIC NEGATIVE: 0
ALLERGIC/IMMUNOLOGIC NEGATIVE: 0

## 2024-10-03 ASSESSMENT — REFRACTION_CURRENTRX
OD_BASECURVE: 8.5
OS_CYLINDER: -0.75
OD_BRAND: ACUVUE OASYS FOR ASTIGMATISM
OS_SPHERE: -2.00
OD_CYLINDER: -0.75
OD_CYLINDER: -0.75
OS_DIAMETER: 14.5
OS_BASECURVE: 8.5
OD_AXIS: 180
OS_AXIS: 180
OS_BRAND: ACUVUE 1 DAY MOIST FOR ASTIGMATISM
OD_SPHERE: -2.00
OS_SPHERE: -2.50
OS_BRAND: ACUVUE OASYS FOR ASTIGMATISM
OS_BASECURVE: 8.6
OD_SPHERE: -2.00
OD_AXIS: 180
OD_AXIS: 180
OS_CYLINDER: -1.25
OD_DIAMETER: 14.5
OD_BASECURVE: 8.5
OS_AXIS: 180
OD_SPHERE: -2.50
OS_CYLINDER: -1.25
OD_DIAMETER: 14.5
OS_BASECURVE: 8.5
OS_BRAND: ACUVUE OASYS ASTIGMATISM
OS_DIAMETER: 14.5
OD_CYLINDER: -0.75
OD_DIAMETER: 14.5
OS_SPHERE: -2.00
OD_BRAND: ACUVUE OASYS ASTIGMATISM
OD_BRAND: ACUVUE 1 DAY MOIST FOR ASTIGMATISM
OS_DIAMETER: 14.5
OD_BASECURVE: 8.6
OS_AXIS: 180

## 2024-10-03 ASSESSMENT — TONOMETRY
OS_IOP_MMHG: 18
IOP_METHOD: GOLDMANN APPLANATION
OD_IOP_MMHG: 18

## 2024-10-03 ASSESSMENT — CONF VISUAL FIELD
OD_INFERIOR_TEMPORAL_RESTRICTION: 0
METHOD: COUNTING FINGERS
OS_INFERIOR_NASAL_RESTRICTION: 0
OS_SUPERIOR_NASAL_RESTRICTION: 0
OS_SUPERIOR_TEMPORAL_RESTRICTION: 0
OD_NORMAL: 1
OS_INFERIOR_TEMPORAL_RESTRICTION: 0
OD_INFERIOR_NASAL_RESTRICTION: 0
OS_NORMAL: 1
OD_SUPERIOR_TEMPORAL_RESTRICTION: 0
OD_SUPERIOR_NASAL_RESTRICTION: 0

## 2024-10-03 ASSESSMENT — REFRACTION_WEARINGRX
OD_CYLINDER: -0.50
OD_SPHERE: -2.00
OS_SPHERE: -2.00
OS_CYLINDER: -0.75
OS_AXIS: 170
OD_AXIS: 180

## 2024-10-03 ASSESSMENT — CUP TO DISC RATIO
OD_RATIO: .3
OS_RATIO: .3

## 2024-10-03 ASSESSMENT — REFRACTION
OD_AXIS: 175
OS_AXIS: 180
OD_CYLINDER: -0.75
OD_SPHERE: -1.25
OS_SPHERE: -1.25
OS_CYLINDER: -1.25

## 2024-10-03 ASSESSMENT — SLIT LAMP EXAM - LIDS
COMMENTS: NORMAL
COMMENTS: NORMAL

## 2024-10-03 ASSESSMENT — REFRACTION_MANIFEST
OD_CYLINDER: -0.75
OD_AXIS: 175
OD_SPHERE: -1.50
OS_SPHERE: -1.50
OS_CYLINDER: -1.25
OS_AXIS: 180

## 2024-10-03 ASSESSMENT — EXTERNAL EXAM - RIGHT EYE: OD_EXAM: NORMAL

## 2024-10-03 ASSESSMENT — EXTERNAL EXAM - LEFT EYE: OS_EXAM: NORMAL

## 2024-10-03 NOTE — PROGRESS NOTES
Assessment/Plan   Diagnoses and all orders for this visit:  Type 2 diabetes mellitus without complication, without long-term current use of insulin (Multi)  The patient has diabetes without any evidence of retinopathy.  The patient was advised to maintain tight glucose control, tight blood pressure control, and favorable levels of cholesterol, low density lipoprotein, and high density lipoproteins.  Follow up in one year was recommended.    Myopia of both eyes  Regular astigmatism of both eyes  New spec rx released today per patient request. Ocular health wnl for age OU. Monitor 1 year or sooner prn. Refraction billed today. Pt consents to receiving glasses Rx today. Patient's/guardian's signature obtained to acknowledge and confirm that a paper copy of glasses Rx was given to patient in compliance with [facility] Eyeglass Rule. Electronic copy of Rx will also be available via [employer].   Discussed proper wear, care, replacement of contact lenses. Gave handout. D/c cl wear and RTC if eyes become red, painful, irritated. Monitor 1 year.   CL eval billed today. $35

## 2024-10-25 ENCOUNTER — APPOINTMENT (OUTPATIENT)
Dept: OPHTHALMOLOGY | Facility: CLINIC | Age: 39
End: 2024-10-25
Payer: COMMERCIAL

## 2024-10-31 ENCOUNTER — APPOINTMENT (OUTPATIENT)
Dept: RADIOLOGY | Facility: HOSPITAL | Age: 39
End: 2024-10-31
Payer: COMMERCIAL

## 2024-10-31 ENCOUNTER — APPOINTMENT (OUTPATIENT)
Dept: CARDIOLOGY | Facility: HOSPITAL | Age: 39
End: 2024-10-31
Payer: COMMERCIAL

## 2024-10-31 ENCOUNTER — HOSPITAL ENCOUNTER (EMERGENCY)
Facility: HOSPITAL | Age: 39
Discharge: HOME | End: 2024-10-31
Attending: EMERGENCY MEDICINE
Payer: COMMERCIAL

## 2024-10-31 VITALS
OXYGEN SATURATION: 100 % | SYSTOLIC BLOOD PRESSURE: 123 MMHG | TEMPERATURE: 98.8 F | RESPIRATION RATE: 16 BRPM | HEIGHT: 66 IN | BODY MASS INDEX: 31.98 KG/M2 | DIASTOLIC BLOOD PRESSURE: 75 MMHG | WEIGHT: 199 LBS | HEART RATE: 89 BPM

## 2024-10-31 DIAGNOSIS — E11.65 TYPE 2 DIABETES MELLITUS WITH HYPERGLYCEMIA, WITHOUT LONG-TERM CURRENT USE OF INSULIN: ICD-10-CM

## 2024-10-31 DIAGNOSIS — J06.9 UPPER RESPIRATORY TRACT INFECTION, UNSPECIFIED TYPE: ICD-10-CM

## 2024-10-31 DIAGNOSIS — R20.2 PARESTHESIA: Primary | ICD-10-CM

## 2024-10-31 LAB
ALBUMIN SERPL BCP-MCNC: 4.3 G/DL (ref 3.4–5)
ALP SERPL-CCNC: 78 U/L (ref 33–110)
ALT SERPL W P-5'-P-CCNC: 15 U/L (ref 7–45)
ANION GAP BLDV CALCULATED.4IONS-SCNC: 9 MMOL/L (ref 10–25)
ANION GAP SERPL CALC-SCNC: 12 MMOL/L (ref 10–20)
AST SERPL W P-5'-P-CCNC: 13 U/L (ref 9–39)
BASE EXCESS BLDV CALC-SCNC: 0.5 MMOL/L (ref -2–3)
BASOPHILS # BLD AUTO: 0.04 X10*3/UL (ref 0–0.1)
BASOPHILS NFR BLD AUTO: 0.3 %
BILIRUB DIRECT SERPL-MCNC: 0.1 MG/DL (ref 0–0.3)
BILIRUB SERPL-MCNC: 0.7 MG/DL (ref 0–1.2)
BODY TEMPERATURE: 37 DEGREES CELSIUS
BUN SERPL-MCNC: 10 MG/DL (ref 6–23)
CA-I BLDV-SCNC: 1.24 MMOL/L (ref 1.1–1.33)
CALCIUM SERPL-MCNC: 9.5 MG/DL (ref 8.6–10.3)
CARDIAC TROPONIN I PNL SERPL HS: 3 NG/L (ref 0–13)
CHLORIDE BLDV-SCNC: 102 MMOL/L (ref 98–107)
CHLORIDE SERPL-SCNC: 104 MMOL/L (ref 98–107)
CO2 SERPL-SCNC: 24 MMOL/L (ref 21–32)
CREAT SERPL-MCNC: 0.84 MG/DL (ref 0.5–1.05)
EGFRCR SERPLBLD CKD-EPI 2021: >90 ML/MIN/1.73M*2
EOSINOPHIL # BLD AUTO: 0.02 X10*3/UL (ref 0–0.7)
EOSINOPHIL NFR BLD AUTO: 0.1 %
ERYTHROCYTE [DISTWIDTH] IN BLOOD BY AUTOMATED COUNT: 12.2 % (ref 11.5–14.5)
GLUCOSE BLDV-MCNC: 120 MG/DL (ref 74–99)
GLUCOSE SERPL-MCNC: 143 MG/DL (ref 74–99)
HCO3 BLDV-SCNC: 26 MMOL/L (ref 22–26)
HCT VFR BLD AUTO: 45.5 % (ref 36–46)
HCT VFR BLD EST: 43 % (ref 36–46)
HGB BLD-MCNC: 14.4 G/DL (ref 12–16)
HGB BLDV-MCNC: 14.2 G/DL (ref 12–16)
IMM GRANULOCYTES # BLD AUTO: 0.06 X10*3/UL (ref 0–0.7)
IMM GRANULOCYTES NFR BLD AUTO: 0.4 % (ref 0–0.9)
INHALED O2 CONCENTRATION: 21 %
LACTATE BLDV-SCNC: 0.8 MMOL/L (ref 0.4–2)
LYMPHOCYTES # BLD AUTO: 2.06 X10*3/UL (ref 1.2–4.8)
LYMPHOCYTES NFR BLD AUTO: 14 %
MCH RBC QN AUTO: 26.6 PG (ref 26–34)
MCHC RBC AUTO-ENTMCNC: 31.6 G/DL (ref 32–36)
MCV RBC AUTO: 84 FL (ref 80–100)
MONOCYTES # BLD AUTO: 0.84 X10*3/UL (ref 0.1–1)
MONOCYTES NFR BLD AUTO: 5.7 %
NEUTROPHILS # BLD AUTO: 11.68 X10*3/UL (ref 1.2–7.7)
NEUTROPHILS NFR BLD AUTO: 79.5 %
NRBC BLD-RTO: 0 /100 WBCS (ref 0–0)
OXYHGB MFR BLDV: 56.1 % (ref 45–75)
PCO2 BLDV: 44 MM HG (ref 41–51)
PH BLDV: 7.38 PH (ref 7.33–7.43)
PLATELET # BLD AUTO: 253 X10*3/UL (ref 150–450)
PO2 BLDV: 34 MM HG (ref 35–45)
POTASSIUM BLDV-SCNC: 3.8 MMOL/L (ref 3.5–5.3)
POTASSIUM SERPL-SCNC: 4 MMOL/L (ref 3.5–5.3)
PROT SERPL-MCNC: 7.6 G/DL (ref 6.4–8.2)
RBC # BLD AUTO: 5.41 X10*6/UL (ref 4–5.2)
SAO2 % BLDV: 58 % (ref 45–75)
SARS-COV-2 RNA RESP QL NAA+PROBE: NOT DETECTED
SODIUM BLDV-SCNC: 133 MMOL/L (ref 136–145)
SODIUM SERPL-SCNC: 136 MMOL/L (ref 136–145)
WBC # BLD AUTO: 14.7 X10*3/UL (ref 4.4–11.3)

## 2024-10-31 PROCEDURE — 85025 COMPLETE CBC W/AUTO DIFF WBC: CPT | Performed by: EMERGENCY MEDICINE

## 2024-10-31 PROCEDURE — 93005 ELECTROCARDIOGRAM TRACING: CPT

## 2024-10-31 PROCEDURE — 71046 X-RAY EXAM CHEST 2 VIEWS: CPT

## 2024-10-31 PROCEDURE — 84132 ASSAY OF SERUM POTASSIUM: CPT | Mod: 59 | Performed by: EMERGENCY MEDICINE

## 2024-10-31 PROCEDURE — 36415 COLL VENOUS BLD VENIPUNCTURE: CPT | Performed by: EMERGENCY MEDICINE

## 2024-10-31 PROCEDURE — 99285 EMERGENCY DEPT VISIT HI MDM: CPT | Mod: 25

## 2024-10-31 PROCEDURE — 84132 ASSAY OF SERUM POTASSIUM: CPT | Performed by: EMERGENCY MEDICINE

## 2024-10-31 PROCEDURE — 70450 CT HEAD/BRAIN W/O DYE: CPT | Performed by: RADIOLOGY

## 2024-10-31 PROCEDURE — 84484 ASSAY OF TROPONIN QUANT: CPT | Performed by: EMERGENCY MEDICINE

## 2024-10-31 PROCEDURE — 80076 HEPATIC FUNCTION PANEL: CPT | Performed by: EMERGENCY MEDICINE

## 2024-10-31 PROCEDURE — 70450 CT HEAD/BRAIN W/O DYE: CPT

## 2024-10-31 PROCEDURE — 83718 ASSAY OF LIPOPROTEIN: CPT | Performed by: STUDENT IN AN ORGANIZED HEALTH CARE EDUCATION/TRAINING PROGRAM

## 2024-10-31 PROCEDURE — 87635 SARS-COV-2 COVID-19 AMP PRB: CPT | Performed by: EMERGENCY MEDICINE

## 2024-10-31 ASSESSMENT — PAIN SCALES - GENERAL: PAINLEVEL_OUTOF10: 4

## 2024-10-31 ASSESSMENT — PAIN DESCRIPTION - PROGRESSION: CLINICAL_PROGRESSION: NOT CHANGED

## 2024-10-31 ASSESSMENT — PAIN - FUNCTIONAL ASSESSMENT: PAIN_FUNCTIONAL_ASSESSMENT: 0-10

## 2024-10-31 ASSESSMENT — PAIN DESCRIPTION - PAIN TYPE: TYPE: ACUTE PAIN

## 2024-10-31 ASSESSMENT — PAIN DESCRIPTION - DESCRIPTORS
DESCRIPTORS: ACHING
DESCRIPTORS: ACHING

## 2024-10-31 ASSESSMENT — COLUMBIA-SUICIDE SEVERITY RATING SCALE - C-SSRS
1. IN THE PAST MONTH, HAVE YOU WISHED YOU WERE DEAD OR WISHED YOU COULD GO TO SLEEP AND NOT WAKE UP?: NO
2. HAVE YOU ACTUALLY HAD ANY THOUGHTS OF KILLING YOURSELF?: NO
6. HAVE YOU EVER DONE ANYTHING, STARTED TO DO ANYTHING, OR PREPARED TO DO ANYTHING TO END YOUR LIFE?: NO

## 2024-10-31 ASSESSMENT — PAIN DESCRIPTION - ORIENTATION: ORIENTATION: LEFT

## 2024-10-31 ASSESSMENT — PAIN DESCRIPTION - LOCATION: LOCATION: CHEST

## 2024-10-31 ASSESSMENT — PAIN DESCRIPTION - FREQUENCY: FREQUENCY: CONSTANT/CONTINUOUS

## 2024-10-31 ASSESSMENT — PAIN DESCRIPTION - ONSET: ONSET: GRADUAL

## 2024-10-31 NOTE — PROGRESS NOTES
FUV for diabetes. LV with me 2024.    History of Present Illness  38 y.o. female with hx of type 2 diabetes.     Dx:   HbA1c:  5.9% (2024), 5.5% (2024), 6.4% (2024), 8.8% (2023), 9.9% (), 12.6% (2021)  Current regimen: metformin 1g QDAY glipizide 5 mg QDAY, Ozempic 2 mg/week  Past medications: Victoza (nausea), Trulicity  Complications: neuropathy  Comorbidities: obesity, HLD     SMBG: Abraham 2     Hypoglycemia: no     Diet:  Breakfast: sausage and eggs  Lunch sandwich and chips  Dinner baked chicken and spinach  Drinks: mostly unsweet tea but sometimes sweetened, occasional regular ginger ale    Today:  -recovering from a viral infection    ROS  General: no fever or chills  CV: no chest pain   Respiratory: no shortness of breath  MSK: no lower extremity edema  Neuro: no headache or dizziness  See HPI for Endocrine ROS    Past Medical History:   Diagnosis Date    Personal history of other endocrine, nutritional and metabolic disease 2020    History of diabetes mellitus       Past Surgical History:   Procedure Laterality Date     SECTION, LOW TRANSVERSE  2015     Section Low Transverse       Social History     Socioeconomic History    Marital status: Single     Spouse name: Not on file    Number of children: Not on file    Years of education: Not on file    Highest education level: Not on file   Occupational History    Not on file   Tobacco Use    Smoking status: Unknown    Smokeless tobacco: Not on file   Substance and Sexual Activity    Alcohol use: Not on file    Drug use: Not on file    Sexual activity: Not on file   Other Topics Concern    Not on file   Social History Narrative    Not on file     Social Drivers of Health     Financial Resource Strain: Not on file   Food Insecurity: Not on file   Transportation Needs: Not on file   Physical Activity: Not on file   Stress: Not on file   Social Connections: Not on file   Intimate Partner Violence:  "Not on file   Housing Stability: Not on file       Physical Exam   height is 1.702 m (5' 7\") and weight is 83.9 kg (185 lb). Her tympanic temperature is 37 °C (98.6 °F). Her blood pressure is 111/71 and her pulse is 75. Her respiration is 18.   General: not in acute distress  HEENT: RYAN HUDSON  Thyroid: no goiter  Neuro: alert and oriented x 3    Current Outpatient Medications   Medication Sig Dispense Refill    acetaminophen (Tylenol) 500 mg tablet Take 2 tablets (1,000 mg) by mouth every 8 hours if needed.      albuterol 90 mcg/actuation inhaler Inhale 2 puffs every 6 hours if needed.      aspirin 81 mg EC tablet Take 1 tablet (81 mg) by mouth once daily.      glipiZIDE (Glucotrol) 5 mg tablet Take 1 tablet daily in the morning. 90 tablet 3    semaglutide (Ozempic) 2 mg/dose (8 mg/3 mL) pen injector Inject 2 mg under the skin 1 (one) time per week. 9 mL 3    simvastatin (Zocor) 20 mg tablet Take 1 tablet (20 mg) by mouth once daily. 90 tablet 3    FreeStyle Abraham 3 Plus Sensor device Change sensor every 15 days 6 each 3    metFORMIN (Glucophage) 1,000 mg tablet Take 1 tablet (1,000 mg) by mouth once daily with breakfast. 90 tablet 3     No current facility-administered medications for this visit.           Assessment and Plan  38 y.o. female with hx of uncontrolled T2DM, here for follow-up.     1. Type 2 diabetes mellitus, uncontrolled  HbA1c: 5.9% (11/5/2024), 5.5% (6/25/2024), 6.4% (1/11/2024), 8.8% (05/2023), 9.9% (11/22/52021), 12.6% (07/2021)  Current regimen: metformin 1g QDAY, glipizide 5 mg QDAY, Ozempic 2 mg/week  Eye exam: no DR (10/2024)  Urine microalbumin: neg (06/2024)  Podiatry: LV 2022, was told to see on a prn basis  Lipids: HDL 55, , TG 71 (06/2024) on simvastatin 20 mg QDAY     A1c: 5.9% today. Maintaining excellent glycemic control.  Abraham download reviewed.   Still with occasional hypoglycemia but she reports the two days recorded above with low blood sugars was because she was sick and " not eating. She does have hypoglycemia occasionally when goes for a long time without eating even if not sick.  Will stop glipizide.  She will notify me in 1 month to review her Abraham readings.    Taking simvastatin in the morning consistently now.   Lipid panel added on to labs from 10/31.    PLAN:  -continue Ozempic 2 mg/week  -stop glipizide  -continue metformin 1g QDAY  -advised patient to notify me for BG<80    Follow-up in 6 months   Uses [facility].

## 2024-11-01 LAB
ATRIAL RATE: 93 BPM
P AXIS: 27 DEGREES
P OFFSET: 189 MS
P ONSET: 135 MS
PR INTERVAL: 170 MS
Q ONSET: 220 MS
QRS COUNT: 15 BEATS
QRS DURATION: 82 MS
QT INTERVAL: 338 MS
QTC CALCULATION(BAZETT): 420 MS
QTC FREDERICIA: 391 MS
R AXIS: 55 DEGREES
T AXIS: 12 DEGREES
T OFFSET: 389 MS
VENTRICULAR RATE: 93 BPM

## 2024-11-05 ENCOUNTER — APPOINTMENT (OUTPATIENT)
Dept: ENDOCRINOLOGY | Facility: CLINIC | Age: 39
End: 2024-11-05
Payer: COMMERCIAL

## 2024-11-05 VITALS
SYSTOLIC BLOOD PRESSURE: 111 MMHG | HEART RATE: 75 BPM | TEMPERATURE: 98.6 F | BODY MASS INDEX: 29.03 KG/M2 | DIASTOLIC BLOOD PRESSURE: 71 MMHG | HEIGHT: 67 IN | WEIGHT: 185 LBS | RESPIRATION RATE: 18 BRPM

## 2024-11-05 DIAGNOSIS — E78.2 MIXED HYPERLIPIDEMIA: ICD-10-CM

## 2024-11-05 DIAGNOSIS — E11.65 TYPE 2 DIABETES MELLITUS WITH HYPERGLYCEMIA, WITHOUT LONG-TERM CURRENT USE OF INSULIN: Primary | ICD-10-CM

## 2024-11-05 LAB
CHOLEST SERPL-MCNC: 158 MG/DL (ref 0–199)
CHOLESTEROL/HDL RATIO: 2.6
HDLC SERPL-MCNC: 60.5 MG/DL
LDLC SERPL CALC-MCNC: 88 MG/DL
NON HDL CHOLESTEROL: 98 MG/DL (ref 0–149)
POC HEMOGLOBIN A1C: 5.9 % (ref 4.2–6.5)
TRIGL SERPL-MCNC: 46 MG/DL (ref 0–149)
VLDL: 9 MG/DL (ref 0–40)

## 2024-11-05 PROCEDURE — 83036 HEMOGLOBIN GLYCOSYLATED A1C: CPT | Performed by: STUDENT IN AN ORGANIZED HEALTH CARE EDUCATION/TRAINING PROGRAM

## 2024-11-05 PROCEDURE — 3048F LDL-C <100 MG/DL: CPT | Performed by: STUDENT IN AN ORGANIZED HEALTH CARE EDUCATION/TRAINING PROGRAM

## 2024-11-05 PROCEDURE — 3062F POS MACROALBUMINURIA REV: CPT | Performed by: STUDENT IN AN ORGANIZED HEALTH CARE EDUCATION/TRAINING PROGRAM

## 2024-11-05 PROCEDURE — 3044F HG A1C LEVEL LT 7.0%: CPT | Performed by: STUDENT IN AN ORGANIZED HEALTH CARE EDUCATION/TRAINING PROGRAM

## 2024-11-05 PROCEDURE — 3078F DIAST BP <80 MM HG: CPT | Performed by: STUDENT IN AN ORGANIZED HEALTH CARE EDUCATION/TRAINING PROGRAM

## 2024-11-05 PROCEDURE — 95251 CONT GLUC MNTR ANALYSIS I&R: CPT | Performed by: STUDENT IN AN ORGANIZED HEALTH CARE EDUCATION/TRAINING PROGRAM

## 2024-11-05 PROCEDURE — 3049F LDL-C 100-129 MG/DL: CPT | Performed by: STUDENT IN AN ORGANIZED HEALTH CARE EDUCATION/TRAINING PROGRAM

## 2024-11-05 PROCEDURE — 3074F SYST BP LT 130 MM HG: CPT | Performed by: STUDENT IN AN ORGANIZED HEALTH CARE EDUCATION/TRAINING PROGRAM

## 2024-11-05 PROCEDURE — 99214 OFFICE O/P EST MOD 30 MIN: CPT | Performed by: STUDENT IN AN ORGANIZED HEALTH CARE EDUCATION/TRAINING PROGRAM

## 2024-11-05 PROCEDURE — 3008F BODY MASS INDEX DOCD: CPT | Performed by: STUDENT IN AN ORGANIZED HEALTH CARE EDUCATION/TRAINING PROGRAM

## 2024-11-05 RX ORDER — HYDROCHLOROTHIAZIDE 12.5 MG/1
CAPSULE ORAL
Qty: 6 EACH | Refills: 3 | Status: SHIPPED | OUTPATIENT
Start: 2024-11-05

## 2024-11-05 RX ORDER — SIMVASTATIN 20 MG/1
20 TABLET, FILM COATED ORAL
Qty: 90 TABLET | Refills: 3 | Status: SHIPPED | OUTPATIENT
Start: 2024-11-05

## 2024-11-05 RX ORDER — METFORMIN HYDROCHLORIDE 1000 MG/1
1000 TABLET ORAL
Qty: 90 TABLET | Refills: 3 | Status: SHIPPED | OUTPATIENT
Start: 2024-11-05

## 2025-02-03 DIAGNOSIS — E11.65 TYPE 2 DIABETES MELLITUS WITH HYPERGLYCEMIA, WITHOUT LONG-TERM CURRENT USE OF INSULIN: ICD-10-CM

## 2025-02-03 RX ORDER — SEMAGLUTIDE 2.68 MG/ML
2 INJECTION, SOLUTION SUBCUTANEOUS
Qty: 9 ML | Refills: 1 | Status: SHIPPED | OUTPATIENT
Start: 2025-02-03

## 2025-04-30 LAB
ALBUMIN SERPL-MCNC: 4.5 G/DL (ref 3.6–5.1)
ALBUMIN/CREAT UR: 1 MG/G CREAT
ALP SERPL-CCNC: 80 U/L (ref 31–125)
ALT SERPL-CCNC: 12 U/L (ref 6–29)
ANION GAP SERPL CALCULATED.4IONS-SCNC: 10 MMOL/L (CALC) (ref 7–17)
AST SERPL-CCNC: 12 U/L (ref 10–30)
BILIRUB SERPL-MCNC: 0.6 MG/DL (ref 0.2–1.2)
BUN SERPL-MCNC: 11 MG/DL (ref 7–25)
CALCIUM SERPL-MCNC: 9.8 MG/DL (ref 8.6–10.2)
CHLORIDE SERPL-SCNC: 103 MMOL/L (ref 98–110)
CHOLEST SERPL-MCNC: 154 MG/DL
CHOLEST/HDLC SERPL: 2.4 (CALC)
CO2 SERPL-SCNC: 25 MMOL/L (ref 20–32)
CREAT SERPL-MCNC: 0.69 MG/DL (ref 0.5–0.97)
CREAT UR-MCNC: 160 MG/DL (ref 20–275)
EGFRCR SERPLBLD CKD-EPI 2021: 114 ML/MIN/1.73M2
EST. AVERAGE GLUCOSE BLD GHB EST-MCNC: 146 MG/DL
EST. AVERAGE GLUCOSE BLD GHB EST-SCNC: 8.1 MMOL/L
GLUCOSE SERPL-MCNC: 143 MG/DL (ref 65–99)
HBA1C MFR BLD: 6.7 %
HDLC SERPL-MCNC: 63 MG/DL
LDLC SERPL CALC-MCNC: 78 MG/DL (CALC)
MICROALBUMIN UR-MCNC: 0.2 MG/DL
NONHDLC SERPL-MCNC: 91 MG/DL (CALC)
POTASSIUM SERPL-SCNC: 4.3 MMOL/L (ref 3.5–5.3)
PROT SERPL-MCNC: 7.1 G/DL (ref 6.1–8.1)
SODIUM SERPL-SCNC: 138 MMOL/L (ref 135–146)
TRIGL SERPL-MCNC: 54 MG/DL

## 2025-04-30 NOTE — PROGRESS NOTES
FUV for diabetes. LV with me 2024.    History of Present Illness  39 y.o. female with hx of type 2 diabetes.     Dx:   HbA1c: 6.7% (2025), 5.9% (2024), 5.5% (2024), 6.4% (2024), 8.8% (2023), 9.9% (), 12.6% (2021)  Current regimen: metformin 1g Qevening, Ozempic 2 mg/week  Past medications: Victoza (nausea), Trulicity, glipizide  Complications: neuropathy  Comorbidities: obesity, HLD     SMBG: Abraham 3     Hypoglycemia: no     Diet:  Breakfast: sausage and eggs  Lunch sandwich and chips  Dinner baked chicken and spinach  Drinks: mostly unsweet tea but sometimes sweetened, occasional regular ginger ale    Today:  -doing well    ROS  General: no fever or chills  CV: no chest pain   Respiratory: no shortness of breath  MSK: no lower extremity edema  Neuro: no headache or dizziness  See HPI for Endocrine ROS    Past Medical History:   Diagnosis Date    Personal history of other endocrine, nutritional and metabolic disease 2020    History of diabetes mellitus       Past Surgical History:   Procedure Laterality Date     SECTION, LOW TRANSVERSE  2015     Section Low Transverse       Social History     Socioeconomic History    Marital status: Single     Spouse name: Not on file    Number of children: Not on file    Years of education: Not on file    Highest education level: Not on file   Occupational History    Not on file   Tobacco Use    Smoking status: Unknown    Smokeless tobacco: Not on file   Substance and Sexual Activity    Alcohol use: Not on file    Drug use: Not on file    Sexual activity: Not on file   Other Topics Concern    Not on file   Social History Narrative    Not on file     Social Drivers of Health     Financial Resource Strain: Not on file   Food Insecurity: Not on file   Transportation Needs: Not on file   Physical Activity: Not on file   Stress: Not on file   Social Connections: Not on file   Intimate Partner Violence: Not on file    Housing Stability: Not on file       Physical Exam   vitals were not taken for this visit.   General: not in acute distress  HEENT: RYAN HUDSON  Thyroid: no goiter  Neuro: alert and oriented x 3    Current Outpatient Medications   Medication Sig Dispense Refill    acetaminophen (Tylenol) 500 mg tablet Take 2 tablets (1,000 mg) by mouth every 8 hours if needed.      albuterol 90 mcg/actuation inhaler Inhale 2 puffs every 6 hours if needed.      aspirin 81 mg EC tablet Take 1 tablet (81 mg) by mouth once daily.      FreeStyle Abraham 3 Plus Sensor device Change sensor every 15 days 6 each 3    metFORMIN (Glucophage) 1,000 mg tablet Take 1 tablet (1,000 mg) by mouth once daily with breakfast. 90 tablet 3    semaglutide (Ozempic) 2 mg/dose (8 mg/3 mL) pen injector Inject 2 mg under the skin 1 (one) time per week. 9 mL 1    simvastatin (Zocor) 20 mg tablet Take 1 tablet (20 mg) by mouth once daily. 90 tablet 3     No current facility-administered medications for this visit.           Assessment and Plan  39 y.o. female with hx of type 2 diabetes, HLD here for follow-up.     1. Type 2 diabetes mellitus, uncontrolled  HbA1c: 6.7% (04/2025), 5.9% (11/5/2024), 5.5% (6/25/2024), 6.4% (1/11/2024), 8.8% (05/2023), 9.9% (11/22/2021), 12.6% (07/2021)  Current regimen: metformin 1g Qevening, Ozempic 2 mg/week  Eye exam: no DR (10/2024)  Urine microalbumin: 1 mg/g (04/2025)  Podiatry: LV 2022  Lipids: HDL 63, LDL 78, TG 54 (04/2025) on simvastatin 20 mg QDAY     A1c: 6.7%.  Stopped glipizide at last visit.  Abraham download reviewed.   Hypoglycemia resolved but now with slightly more post-prandial hyperglycemia.  Patient denies any changes in dietary habits.  Taking metformin in the evening. Will increase to BID.    Given that her glycemic control has been stable over the past year, discussed transitioning care back to her PCP. She is agreeable to this.    PLAN:  -continue Ozempic 2 mg/week  -increase metformin to 1g BID  -due for  podiatry  -advised to schedule a follow up appointment with her PCP    Follow-up as needed  Uses [facility].

## 2025-05-06 ENCOUNTER — APPOINTMENT (OUTPATIENT)
Dept: ENDOCRINOLOGY | Facility: CLINIC | Age: 40
End: 2025-05-06
Payer: COMMERCIAL

## 2025-05-06 VITALS
HEART RATE: 63 BPM | SYSTOLIC BLOOD PRESSURE: 118 MMHG | BODY MASS INDEX: 29.82 KG/M2 | DIASTOLIC BLOOD PRESSURE: 76 MMHG | WEIGHT: 190.4 LBS

## 2025-05-06 DIAGNOSIS — E11.65 TYPE 2 DIABETES MELLITUS WITH HYPERGLYCEMIA, WITHOUT LONG-TERM CURRENT USE OF INSULIN: ICD-10-CM

## 2025-05-06 PROCEDURE — 99215 OFFICE O/P EST HI 40 MIN: CPT | Performed by: STUDENT IN AN ORGANIZED HEALTH CARE EDUCATION/TRAINING PROGRAM

## 2025-05-06 PROCEDURE — 3078F DIAST BP <80 MM HG: CPT | Performed by: STUDENT IN AN ORGANIZED HEALTH CARE EDUCATION/TRAINING PROGRAM

## 2025-05-06 PROCEDURE — 3074F SYST BP LT 130 MM HG: CPT | Performed by: STUDENT IN AN ORGANIZED HEALTH CARE EDUCATION/TRAINING PROGRAM

## 2025-05-06 PROCEDURE — 95251 CONT GLUC MNTR ANALYSIS I&R: CPT | Performed by: STUDENT IN AN ORGANIZED HEALTH CARE EDUCATION/TRAINING PROGRAM

## 2025-05-06 RX ORDER — METFORMIN HYDROCHLORIDE 1000 MG/1
1000 TABLET ORAL 2 TIMES DAILY
Qty: 180 TABLET | Refills: 3 | Status: SHIPPED | OUTPATIENT
Start: 2025-05-06

## 2025-05-06 RX ORDER — SEMAGLUTIDE 2.68 MG/ML
2 INJECTION, SOLUTION SUBCUTANEOUS
Qty: 9 ML | Refills: 3 | Status: SHIPPED | OUTPATIENT
Start: 2025-05-06

## 2025-05-06 ASSESSMENT — ENCOUNTER SYMPTOMS
OCCASIONAL FEELINGS OF UNSTEADINESS: 0
DEPRESSION: 0
LOSS OF SENSATION IN FEET: 0

## 2025-05-06 ASSESSMENT — PAIN SCALES - GENERAL: PAINLEVEL_OUTOF10: 0-NO PAIN

## 2025-06-17 ENCOUNTER — APPOINTMENT (OUTPATIENT)
Dept: OBSTETRICS AND GYNECOLOGY | Facility: CLINIC | Age: 40
End: 2025-06-17
Payer: COMMERCIAL

## 2025-06-17 VITALS
DIASTOLIC BLOOD PRESSURE: 60 MMHG | HEIGHT: 66 IN | WEIGHT: 185 LBS | SYSTOLIC BLOOD PRESSURE: 110 MMHG | BODY MASS INDEX: 29.73 KG/M2

## 2025-06-17 DIAGNOSIS — Z30.433 ENCOUNTER FOR IUD REMOVAL AND REINSERTION: Primary | ICD-10-CM

## 2025-06-17 DIAGNOSIS — Z32.00 ENCOUNTER FOR PREGNANCY TEST, RESULT UNKNOWN: ICD-10-CM

## 2025-06-17 DIAGNOSIS — Z01.419 WELL WOMAN EXAM WITH ROUTINE GYNECOLOGICAL EXAM: ICD-10-CM

## 2025-06-17 LAB — PREGNANCY TEST URINE, POC: NEGATIVE

## 2025-06-17 PROCEDURE — 87591 N.GONORRHOEAE DNA AMP PROB: CPT

## 2025-06-17 PROCEDURE — 3078F DIAST BP <80 MM HG: CPT | Performed by: OBSTETRICS & GYNECOLOGY

## 2025-06-17 PROCEDURE — 87661 TRICHOMONAS VAGINALIS AMPLIF: CPT

## 2025-06-17 PROCEDURE — 3074F SYST BP LT 130 MM HG: CPT | Performed by: OBSTETRICS & GYNECOLOGY

## 2025-06-17 PROCEDURE — 58301 REMOVE INTRAUTERINE DEVICE: CPT | Performed by: OBSTETRICS & GYNECOLOGY

## 2025-06-17 PROCEDURE — 58300 INSERT INTRAUTERINE DEVICE: CPT | Performed by: OBSTETRICS & GYNECOLOGY

## 2025-06-17 PROCEDURE — 3008F BODY MASS INDEX DOCD: CPT | Performed by: OBSTETRICS & GYNECOLOGY

## 2025-06-17 PROCEDURE — 99204 OFFICE O/P NEW MOD 45 MIN: CPT | Performed by: OBSTETRICS & GYNECOLOGY

## 2025-06-17 PROCEDURE — 1036F TOBACCO NON-USER: CPT | Performed by: OBSTETRICS & GYNECOLOGY

## 2025-06-17 PROCEDURE — 87491 CHLMYD TRACH DNA AMP PROBE: CPT

## 2025-06-17 PROCEDURE — 87626 HPV SEP HI-RSK TYP&POOL RSLT: CPT

## 2025-06-17 PROCEDURE — 81025 URINE PREGNANCY TEST: CPT | Performed by: OBSTETRICS & GYNECOLOGY

## 2025-06-17 SDOH — ECONOMIC STABILITY: INCOME INSECURITY: IN THE LAST 12 MONTHS, WAS THERE A TIME WHEN YOU WERE NOT ABLE TO PAY THE MORTGAGE OR RENT ON TIME?: NO

## 2025-06-17 SDOH — ECONOMIC STABILITY: FOOD INSECURITY: WITHIN THE PAST 12 MONTHS, THE FOOD YOU BOUGHT JUST DIDN'T LAST AND YOU DIDN'T HAVE MONEY TO GET MORE.: NEVER TRUE

## 2025-06-17 SDOH — ECONOMIC STABILITY: FOOD INSECURITY: WITHIN THE PAST 12 MONTHS, YOU WORRIED THAT YOUR FOOD WOULD RUN OUT BEFORE YOU GOT MONEY TO BUY MORE.: NEVER TRUE

## 2025-06-17 SDOH — ECONOMIC STABILITY: TRANSPORTATION INSECURITY
IN THE PAST 12 MONTHS, HAS THE LACK OF TRANSPORTATION KEPT YOU FROM MEDICAL APPOINTMENTS OR FROM GETTING MEDICATIONS?: NO

## 2025-06-17 SDOH — ECONOMIC STABILITY: TRANSPORTATION INSECURITY
IN THE PAST 12 MONTHS, HAS LACK OF TRANSPORTATION KEPT YOU FROM MEETINGS, WORK, OR FROM GETTING THINGS NEEDED FOR DAILY LIVING?: NO

## 2025-06-17 ASSESSMENT — ENCOUNTER SYMPTOMS
OCCASIONAL FEELINGS OF UNSTEADINESS: 0
DEPRESSION: 0
LOSS OF SENSATION IN FEET: 0

## 2025-06-17 ASSESSMENT — SOCIAL DETERMINANTS OF HEALTH (SDOH)
WITHIN THE LAST YEAR, HAVE TO BEEN RAPED OR FORCED TO HAVE ANY KIND OF SEXUAL ACTIVITY BY YOUR PARTNER OR EX-PARTNER?: NO
HOW HARD IS IT FOR YOU TO PAY FOR THE VERY BASICS LIKE FOOD, HOUSING, MEDICAL CARE, AND HEATING?: NOT HARD AT ALL
WITHIN THE LAST YEAR, HAVE YOU BEEN HUMILIATED OR EMOTIONALLY ABUSED IN OTHER WAYS BY YOUR PARTNER OR EX-PARTNER?: NO
WITHIN THE LAST YEAR, HAVE YOU BEEN AFRAID OF YOUR PARTNER OR EX-PARTNER?: NO
WITHIN THE LAST YEAR, HAVE YOU BEEN KICKED, HIT, SLAPPED, OR OTHERWISE PHYSICALLY HURT BY YOUR PARTNER OR EX-PARTNER?: NO

## 2025-06-17 ASSESSMENT — LIFESTYLE VARIABLES
HOW OFTEN DO YOU HAVE SIX OR MORE DRINKS ON ONE OCCASION: NEVER
HOW MANY STANDARD DRINKS CONTAINING ALCOHOL DO YOU HAVE ON A TYPICAL DAY: 1 OR 2
HOW OFTEN DO YOU HAVE A DRINK CONTAINING ALCOHOL: 2-4 TIMES A MONTH
AUDIT-C TOTAL SCORE: 2
SKIP TO QUESTIONS 9-10: 1

## 2025-06-17 ASSESSMENT — PATIENT HEALTH QUESTIONNAIRE - PHQ9
2. FEELING DOWN, DEPRESSED OR HOPELESS: NOT AT ALL
SUM OF ALL RESPONSES TO PHQ9 QUESTIONS 1 AND 2: 0
1. LITTLE INTEREST OR PLEASURE IN DOING THINGS: NOT AT ALL

## 2025-06-17 NOTE — PROGRESS NOTES
38 yo  (15 yo daughter C/S) desires IUD removal/replacement. She has had 3 IUDs since daughter's birth. Gets her period every 3-5 months.   No problems with insertion.      Never had abnormal Pap.  Last Pap 2020 NILM, HR-HPV neg x 3  No STIs.   With LTP x 12 years.  2 cigars/day.  Occasional edibles. Walks for exercise. Seatbelts.    Diabetes,   Ozempic, metformin, simvastatin.  Off glipizide.       Patient ID: Sofya Beck is a 39 y.o. female.    IUD Management    Performed by: Tresa Merrill MD MPH  Authorized by: Tresa Merrill MD MPH    Procedure: IUD removal and insertion    Consent obtained by patient, parent, or legal power of  - including discussion of procedure risks and benefits, patient questions answered, and patient education provided: yes    Reason for removal: patient request    Strings visualized: no    Cervix cleaned with: chlorhexidine    Tenaculum applied to cervix: yes    IUD grasped by forceps: yes    Performed with ultrasound guidance: yes    IUD removed: yes    Date/Time of Removal:  2025 4:25 PM  Removed without complications: yes    IUD intact: yes    Pregnancy risk: reasonably certain the patient is not pregnant    Date/Time of Insertion:  2025 4:25 PM  Immediately prior to procedure a time out was called: yes    Pelvic exam performed: yes    Speculum placed in vagina: yes    Cervix cleaned and prepped: yes    Tenaculum/Allis/Ring Forceps applied to cervix: yes    Anesthesia used: yes    Local anesthesia:  Intracervical  Local anesthetic:  Lidocaine  Anesthetic strength (%):  1  OSM: levonorgestrel 20 mcg/24hr  Strings trimmed to (cm):  1  Patient tolerated procedure well: yes    Inserted with ultrasound guidance: yes    Transvaginal sono confirmed fundal placement: no    Estimated blood loss (mL):  0  Intended removal date: 8 years

## 2025-06-18 LAB
C TRACH RRNA SPEC QL NAA+PROBE: NEGATIVE
N GONORRHOEA DNA SPEC QL PROBE+SIG AMP: NEGATIVE
T VAGINALIS RRNA SPEC QL NAA+PROBE: NEGATIVE

## 2025-07-01 LAB
CYTOLOGY CMNT CVX/VAG CYTO-IMP: NORMAL
HPV HR 12 DNA GENITAL QL NAA+PROBE: NEGATIVE
HPV HR GENOTYPES PNL CVX NAA+PROBE: NEGATIVE
HPV16 DNA SPEC QL NAA+PROBE: NEGATIVE
HPV18 DNA SPEC QL NAA+PROBE: NEGATIVE
LAB AP CONTRACEPTIVE HISTORY: NORMAL
LAB AP HPV GENOTYPE QUESTION: YES
LAB AP HPV HR: NORMAL
LAB AP PAP ADDITIONAL TESTS: NORMAL
LABORATORY COMMENT REPORT: NORMAL
PATH REPORT.TOTAL CANCER: NORMAL

## 2025-07-03 ENCOUNTER — APPOINTMENT (OUTPATIENT)
Dept: PRIMARY CARE | Facility: CLINIC | Age: 40
End: 2025-07-03
Payer: COMMERCIAL

## 2025-07-03 VITALS
WEIGHT: 188.2 LBS | HEIGHT: 66 IN | DIASTOLIC BLOOD PRESSURE: 78 MMHG | BODY MASS INDEX: 30.25 KG/M2 | HEART RATE: 76 BPM | SYSTOLIC BLOOD PRESSURE: 115 MMHG | OXYGEN SATURATION: 95 %

## 2025-07-03 DIAGNOSIS — Z00.00 WELL ADULT EXAM: Primary | ICD-10-CM

## 2025-07-03 PROCEDURE — 3074F SYST BP LT 130 MM HG: CPT | Performed by: FAMILY MEDICINE

## 2025-07-03 PROCEDURE — 3078F DIAST BP <80 MM HG: CPT | Performed by: FAMILY MEDICINE

## 2025-07-03 PROCEDURE — 1036F TOBACCO NON-USER: CPT | Performed by: FAMILY MEDICINE

## 2025-07-03 PROCEDURE — 99395 PREV VISIT EST AGE 18-39: CPT | Performed by: FAMILY MEDICINE

## 2025-07-03 PROCEDURE — 3008F BODY MASS INDEX DOCD: CPT | Performed by: FAMILY MEDICINE

## 2025-07-03 NOTE — PROGRESS NOTES
Chief Complaint:  Annual Exam (APE. )  History Of Present Illness:   Sofya Beck is a 39 y.o. female           persistent migraine, elevated blood sugars, and worsening anxiety/panic/insomnia.        Lateral left hip pain recently- sometimes at rest. Not groin area.       Migraine - started 5 days ago, back of neck and eye pain. First day, patient had n/v and diarrhea. Patient missed work due to symptoms. Patient took tylenol without relief. Then tried excedrine migraine with caffeine which helped with the nausea; has been taking this for the last few days. Patient was able to function but did not completely relieve her symtpoms Endorses light sensitivity and some noise sensitivity. Today in office, endorses mild nausea but still endorses neck pain and eye pain with dizziness. Patient given Nurtec in office (LOT [id number]). 2025 update: 2 in the past month.        Diabetes - Patient on trulicity, metformin, and glipizde and simvistatin. Patient is tolerating regimen well with no complaints. Per blood glucose log, patient reports her blood sugars are getting higher over the last few weeks. Normally she is 115-130 in the AM and low 200's during the day. She reports elevated levels with one reading close to 500. Patient has not lost weight despite going for more walks and eating less due to decreased appetite.   2025 update: Current regimen: metformin 1g Qevening, Ozempic 2 mg/week  Past medications: Victoza (nausea), Trulicity, glipizide  Complications: neuropathy  Comorbidities: obesity, HLD         Anxiety/panic/insomnia - Patient has been dealing with deaths in the family which she feels is contributing to added anxiety. Panic attacks first started post  of her grandfather back in 2022. Patient endorses feeling stressed in her job as well. With regards to insomnia, patient endorses edibles help her fall asleep but on average, patient gets about 2.5 hours of sleep per night. Patient  endorses decreased appetite and decreased energy as well. She tried an SSRI last year, which resulted in feelings of depression so she stopped taking the medication. Patient is interested in a possible psych referral for therapy. 2025 update: moods are better with better DM control.       Weight- ozempic helping- 180s lbs, down from 220s.        Healthcare team:  - endocrinology for DM, but endo is leaving town.   - eye doctor  - chiro  for hip.  - GYN        Allergies: NKDA, shellfish, bees    Surgical History:         Tobacco  Packs per day/years/quit date- never             Social History:  Living situation- house, bf and 2 kids.   Work- supervisor, desk job.   School- [employer]  Alcohol- not really  Illicit Substances- social MJ  Diet- better        Family History:  Cardiac- mat grandfather, father  Cancer- grandmother, cousin, mother      Immunizations:  Influenza- declines  Tdap (every 10 years)- pt states up to date  Zoster (Shingrix)  (Age 50 or older: set of 2 shingrix, second is administered 2-6 months after first shingrix)  Pneumococcal (PCV 20)  (Over age 50; or age 19-49 with predisposing chronic medical conditions)      Patient is aware that they will need to go to local pharmacy to get immunizations that are not offered in clinic.          Health Maintenance   - Colonoscopy (45-75)- never   - Cologuard- never    Mammogram (female, 40+)- never      GYN: has a GYN.      Mood: fine     Sleep: when I can it's good     Sports/Exercise: not exercising now.        Review of Systems (BOLD if positive, delete if not asked):     Constitutional:   - fever   - chills   - night sweats  - unexpected weight change       Eyes:   - loss of vision  - double vision  - floaters     Ear/Nose/Throat/Mouth:   - hearing changes  - sore throat  - sinus congestion     Cardiovascular:   - chest pain  - chest heaviness  - palpitations  - swelling in ankles       Respiratory:  "  - shortness of breath  - difficulty breathing  - frequent cough  - wheezing    Musculoskeletal:   - bone pain  - muscle pain  - joint pain   -low back pain       Neurological:   - headache  - loss of consciousness  - tremors  - dizziness  - numbness   - tingling       Gastrointestinal:   - abdominal pain  - nausea  - vomiting  - constipation  - diarrhea  - bloody stools  - loss of bowel control  - heartburn       Genitourinary:   - urinary incontinence  - increased urinary frequency  - painful urination  - blood in urine     Skin:   - Rash  - lumps or bumps  - worrisome moles     Endocrine:   - excessive thirst  - feeling too hot  - feeling too cold  - fatigue    Hematologic/Lymphatic:   - swollen glands  - blood clotting problems  - easy bruising      Sexual:   - sexual health concerns         Psychological:   - feelings of depression  - feelings of anxiety                   Psychological:   - feeling generally happy  - feeling safe at home          Last Recorded Vitals:  Vitals:    25 1156   BP: 115/78   Pulse: 76   SpO2: 95%   Weight: 85.4 kg (188 lb 3.2 oz)   Height: 1.676 m (5' 6\")        Past Medical History:  She has a past medical history of Personal history of other endocrine, nutritional and metabolic disease (2020).     Past Surgical History:  She has a past surgical history that includes  section, low transverse (2015).     Social History:  She reports that she has never smoked. She has never used smokeless tobacco. She reports that she does not currently use alcohol. She reports that she does not currently use drugs after having used the following drugs: Marijuana.     Family History:  Family History[1]  Allergies:  Bee venom protein (honey bee) and Shellfish containing products     Outpatient Medications:  Current Outpatient Medications   Medication Instructions    acetaminophen (TYLENOL) 1,000 mg, Every 8 hours PRN    albuterol 90 mcg/actuation inhaler 2 puffs, Every 6 " hours PRN    FreeStyle Abraham 3 Plus Sensor device Change sensor every 15 days    metFORMIN (GLUCOPHAGE) 1,000 mg, oral, 2 times daily    Ozempic 2 mg, subcutaneous, Once Weekly    simvastatin (ZOCOR) 20 mg, oral, Daily RT        Physical Exam:  GENERAL: Well developed, well nourished, alert and cooperative, and appears to be in no acute distress.     PSYCH: mood pleasant and appropriate     HEAD: normocephalic     EYES: PERRL, EOMI. vision is grossly intact.          NOSE:    Nares patent b/l.   No nasal discharge.       THROAT:    Oropharynx clear.       NECK: Neck supple, non-tender.        CARDIAC:   RRR   No murmur       LUNGS: Good respiratory effort.  Clear to auscultation bilaterally  No rales  No rhonchi  No wheezing     ABD: soft         GAIT: Normal           EXTREMITIES: All 4 extremities are warm and well perfused.   Peripheral pulses intact.    No cyanosis.  No peripheral edema.     NEUROLOGICAL:   CN II-XII grossly intact.          SKIN:   Skin normal color  No lesions or eruptions.        MUSCULOSKELETAL:    Last Labs:  CBC -  Lab Results   Component Value Date    WBC 14.7 (H) 10/31/2024    HGB 14.4 10/31/2024    HCT 45.5 10/31/2024    MCV 84 10/31/2024     10/31/2024        CMP -  Lab Results   Component Value Date    CALCIUM 9.8 04/29/2025    PROT 7.1 04/29/2025    ALBUMIN 4.5 04/29/2025    AST 12 04/29/2025    ALT 12 04/29/2025    ALKPHOS 80 04/29/2025    BILITOT 0.6 04/29/2025        LIPID PANEL -  Lab Results   Component Value Date    CHOL 154 04/29/2025    TRIG 54 04/29/2025    HDL 63 04/29/2025    CHHDL 2.4 04/29/2025    LDLF 89 05/02/2023    VLDL 9 10/31/2024    NHDL 91 04/29/2025           Lab Results   Component Value Date    HGBA1C 6.7 (H) 04/29/2025    HGBA1C 5.9 11/05/2024          ECG 12 lead  Normal sinus rhythm with sinus arrhythmia  Normal ECG  When compared with ECG of 14-AUG-2021 08:40,  Nonspecific T wave abnormality no longer evident in Lateral leads  See ED provider note  "for full interpretation and clinical correlation  Confirmed by Mariluz Steward ([id number]) on 11/1/2024 7:37:01 PM         Assessment/Plan   Problem List Items Addressed This Visit    None  Visit Diagnoses         Codes      Well adult exam    -  Primary Z00.00             Welcome back to the office.     Make sure that you are established with a GYN provider for routine OB/GYN care.         Recommend checking your blood pressures at home. Ideally \"120/80\" is a good blood pressure, but <140/<90 is a reasonable goal.  If you are relaxed while checking your blood pressures at home and they are higher than 140 for the top number, call the office and we may need to discuss changing your medications, or seeing you in the office sooner.  Please bring in some recorded home blood pressures when you come back to the office.   A list of devices to consider can be found at: [email or web address]://[email or web address].org/devices        No work note needed.        As we agreed to today, I do not complete disability paperwork, or act as the 'physician of record' in [facility] cases.        Referrals and advanced imaging scheduling line: [phone].  Another scheduling number is: [phone].  Another potential phone number is: [phone] ([phone]).  The number for pediatric referrals is: [phone].    Agreed to monitor left hip- or see chiro.       Follow up 4 months for medical management.          Niles Culp,        [1]   Family History  Problem Relation Name Age of Onset    Glaucoma Mother       "

## 2025-08-29 ENCOUNTER — TELEPHONE (OUTPATIENT)
Dept: URGENT CARE | Age: 40
End: 2025-08-29

## 2025-08-29 DIAGNOSIS — G44.89 OTHER HEADACHE SYNDROME: Primary | ICD-10-CM

## 2025-09-04 ENCOUNTER — HOSPITAL ENCOUNTER (OUTPATIENT)
Dept: RADIOLOGY | Facility: CLINIC | Age: 40
Discharge: HOME | End: 2025-09-04
Payer: COMMERCIAL

## 2025-09-04 ENCOUNTER — OFFICE VISIT (OUTPATIENT)
Dept: NEUROLOGY | Facility: CLINIC | Age: 40
End: 2025-09-04
Payer: COMMERCIAL

## 2025-09-04 VITALS
DIASTOLIC BLOOD PRESSURE: 78 MMHG | SYSTOLIC BLOOD PRESSURE: 111 MMHG | BODY MASS INDEX: 28.93 KG/M2 | HEIGHT: 66 IN | HEART RATE: 72 BPM | WEIGHT: 180 LBS

## 2025-09-04 DIAGNOSIS — M54.2 NECK PAIN: ICD-10-CM

## 2025-09-04 DIAGNOSIS — M54.81 BILATERAL OCCIPITAL NEURALGIA: ICD-10-CM

## 2025-09-04 DIAGNOSIS — G43.E09 CHRONIC MIGRAINE WITH AURA WITHOUT STATUS MIGRAINOSUS, NOT INTRACTABLE: Primary | ICD-10-CM

## 2025-09-04 PROCEDURE — 1036F TOBACCO NON-USER: CPT | Performed by: PHYSICIAN ASSISTANT

## 2025-09-04 PROCEDURE — 99202 OFFICE O/P NEW SF 15 MIN: CPT

## 2025-09-04 PROCEDURE — 72050 X-RAY EXAM NECK SPINE 4/5VWS: CPT

## 2025-09-04 PROCEDURE — 3074F SYST BP LT 130 MM HG: CPT | Performed by: PHYSICIAN ASSISTANT

## 2025-09-04 PROCEDURE — 99204 OFFICE O/P NEW MOD 45 MIN: CPT | Performed by: PHYSICIAN ASSISTANT

## 2025-09-04 PROCEDURE — 72050 X-RAY EXAM NECK SPINE 4/5VWS: CPT | Performed by: RADIOLOGY

## 2025-09-04 PROCEDURE — 3008F BODY MASS INDEX DOCD: CPT | Performed by: PHYSICIAN ASSISTANT

## 2025-09-04 PROCEDURE — 3078F DIAST BP <80 MM HG: CPT | Performed by: PHYSICIAN ASSISTANT

## 2025-09-04 RX ORDER — AMITRIPTYLINE HYDROCHLORIDE 10 MG/1
10 TABLET, FILM COATED ORAL DAILY
Qty: 30 TABLET | Refills: 0 | Status: SHIPPED | OUTPATIENT
Start: 2025-09-04 | End: 2025-10-04

## 2025-09-04 ASSESSMENT — ANXIETY QUESTIONNAIRES
4. TROUBLE RELAXING: NOT AT ALL
1. FEELING NERVOUS, ANXIOUS, OR ON EDGE: NOT AT ALL
5. BEING SO RESTLESS THAT IT IS HARD TO SIT STILL: NOT AT ALL
2. NOT BEING ABLE TO STOP OR CONTROL WORRYING: NOT AT ALL
GAD7 TOTAL SCORE: 0
7. FEELING AFRAID AS IF SOMETHING AWFUL MIGHT HAPPEN: NOT AT ALL
6. BECOMING EASILY ANNOYED OR IRRITABLE: NOT AT ALL
3. WORRYING TOO MUCH ABOUT DIFFERENT THINGS: NOT AT ALL
IF YOU CHECKED OFF ANY PROBLEMS ON THIS QUESTIONNAIRE, HOW DIFFICULT HAVE THESE PROBLEMS MADE IT FOR YOU TO DO YOUR WORK, TAKE CARE OF THINGS AT HOME, OR GET ALONG WITH OTHER PEOPLE: NOT DIFFICULT AT ALL

## 2025-09-04 ASSESSMENT — PATIENT HEALTH QUESTIONNAIRE - PHQ9
SUM OF ALL RESPONSES TO PHQ9 QUESTIONS 1 AND 2: 0
2. FEELING DOWN, DEPRESSED OR HOPELESS: NOT AT ALL
1. LITTLE INTEREST OR PLEASURE IN DOING THINGS: NOT AT ALL

## 2025-09-04 ASSESSMENT — ENCOUNTER SYMPTOMS
DEPRESSION: 0
OCCASIONAL FEELINGS OF UNSTEADINESS: 0
LOSS OF SENSATION IN FEET: 0

## 2025-09-05 ENCOUNTER — SPECIALTY PHARMACY (OUTPATIENT)
Dept: PHARMACY | Facility: CLINIC | Age: 40
End: 2025-09-05

## 2025-10-09 ENCOUNTER — APPOINTMENT (OUTPATIENT)
Dept: OPHTHALMOLOGY | Facility: CLINIC | Age: 40
End: 2025-10-09
Payer: COMMERCIAL

## 2025-10-14 ENCOUNTER — APPOINTMENT (OUTPATIENT)
Dept: OPHTHALMOLOGY | Facility: CLINIC | Age: 40
End: 2025-10-14
Payer: COMMERCIAL

## 2025-11-06 ENCOUNTER — APPOINTMENT (OUTPATIENT)
Dept: PRIMARY CARE | Facility: CLINIC | Age: 40
End: 2025-11-06
Payer: COMMERCIAL